# Patient Record
Sex: MALE | Race: WHITE | NOT HISPANIC OR LATINO | Employment: FULL TIME | URBAN - METROPOLITAN AREA
[De-identification: names, ages, dates, MRNs, and addresses within clinical notes are randomized per-mention and may not be internally consistent; named-entity substitution may affect disease eponyms.]

---

## 2017-01-12 ENCOUNTER — OFFICE VISIT (OUTPATIENT)
Dept: NEUROSURGERY | Facility: CLINIC | Age: 42
End: 2017-01-12
Payer: OTHER GOVERNMENT

## 2017-01-12 VITALS
SYSTOLIC BLOOD PRESSURE: 129 MMHG | HEIGHT: 71 IN | DIASTOLIC BLOOD PRESSURE: 81 MMHG | HEART RATE: 71 BPM | WEIGHT: 205.88 LBS | TEMPERATURE: 99 F | BODY MASS INDEX: 28.82 KG/M2

## 2017-01-12 DIAGNOSIS — D35.2 PITUITARY ADENOMA: Primary | ICD-10-CM

## 2017-01-12 PROCEDURE — 99213 OFFICE O/P EST LOW 20 MIN: CPT | Mod: PBBFAC | Performed by: NEUROLOGICAL SURGERY

## 2017-01-12 PROCEDURE — 99204 OFFICE O/P NEW MOD 45 MIN: CPT | Mod: S$PBB,,, | Performed by: NEUROLOGICAL SURGERY

## 2017-01-12 PROCEDURE — 99999 PR PBB SHADOW E&M-EST. PATIENT-LVL III: CPT | Mod: PBBFAC,,, | Performed by: NEUROLOGICAL SURGERY

## 2017-01-12 RX ORDER — TESTOSTERONE CYPIONATE 1000 MG/10ML
INJECTION, SOLUTION INTRAMUSCULAR
Refills: 0 | COMMUNITY
Start: 2016-11-10

## 2017-01-12 NOTE — LETTER
January 12, 2017      Jose Madison MD  [address] [facility]  [place] [address]           [facility] - Neurosurgery 7th Fl  [address]  [facility] [address]  Phone: [phone]          Patient: Roldan Moulton   MR Number: [id number]   YOB: 1975   Date of Visit: 1/12/2017       Dear Dr. Jose Madison:    Thank you for referring Roldan Moulton to me for evaluation. Attached you will find relevant portions of my assessment and plan of care.    If you have questions, please do not hesitate to call me. I look forward to following Roldan Moulton along with you.    Sincerely,    Maximiliano Kidd MD    Enclosure  CC:  No Recipients    If you would like to receive this communication electronically, please contact [email or web address] or [phone] to request more information on [facility] access.    For providers and/or their staff who would like to refer a patient to [facility], please contact us through our one-stop-shop provider referral line, [facility] , at [phone].    If you feel you have received this communication in error or would no longer like to receive these types of communications, please e-mail [email or web address]

## 2017-01-12 NOTE — MR AVS SNAPSHOT
"    [facility] - Neurosurgery   [address]  [place] [address]  Phone: [phone]                  Roldan Moulton   2017 9:00 AM   Office Visit    Description:  Male : 1975   Provider:  Maximiliano Kidd MD   Department:  [facility] - Neurosurgery 7th Fl           Reason for Visit     Brain Tumor           Diagnoses this Visit        Comments    Pituitary adenoma    -  Primary            To Do List           Goals (5 Years of Data)     None      [facility]     [facility] Nurse Care Line -  Assistance  Registered nurses in the [facility] On Call Center provide clinical advisement, health education, appointment booking, and other advisory services.  Call for this free service at [phone].             Medications           Message regarding Medications     Verify the changes and/or additions to your medication regime listed below are the same as discussed with your clinician today.  If any of these changes or additions are incorrect, please notify your healthcare provider.             Verify that the below list of medications is an accurate representation of the medications you are currently taking.  If none reported, the list may be blank. If incorrect, please contact your healthcare provider. Carry this list with you in case of emergency.           Current Medications     testosterone cypionate (DEPOTESTOTERONE CYPIONATE) 100 mg/mL injection take 1 milliliter intramuscularly every week           Clinical Reference Information           Vital Signs - Last Recorded  Most recent update: 2017  9:05 AM by Corinne Olmos MA    BP Pulse Temp Ht Wt BMI    129/81 71 98.5 °F (36.9 °C) (Oral) 5' 11" (1.803 m) 93.4 kg (205 lb 14.4 oz) 28.72 kg/m2      Blood Pressure          Most Recent Value    BP  129/81      Allergies as of 2017     No Known Allergies      Immunizations Administered on Date of Encounter - 2017     None      Orders Placed During Today's Visit     " Future Labs/Procedures Expected by Expires    MRI Brain W WO Contrast  3/14/2017 1/12/2018      [facility] Sign-Up     Activating your [facility] account is as easy as 1-2-3!     1) Visit [email or web address], select Sign Up Now, enter this activation code and your date of birth, then select Next.  [email or web address]  Expires: 2/26/2017  8:58 AM      2) Create a username and password to use when you visit [facility] in the future and select a security question in case you lose your password and select Next.    3) Enter your e-mail address and click Sign Up!    Additional Information  If you have questions, please e-mail [email or web address] or call [phone] to talk to our [facility] staff. Remember, [facility] is NOT to be used for urgent needs. For medical emergencies, dial 911.         Instructions    Will plan craniotomy and excision of tumor in March.       Smoking Cessation     If you would like to quit smoking:   You may be eligible for free services if you are a [place] resident and started smoking cigarettes before September 1, 1988.  Call the Smoking Cessation Trust (SCT) toll free at (364) [phone] or (920) 906-1321.   Call 9-665-QUIT-NOW if you do not meet the above criteria.

## 2017-01-17 NOTE — CONSULTS
January 12, 2017    Jose Madison M.D.  [id number]  [place] [address]    RE:  ITA NEWTON  [facility]  [id number]      Dear Jose:    Ita Newton was seen in neurosurgical consultation at the office this morning.    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  He was not rendered unconscious, but was   lightheaded and complained of headache.  He was seen in an Emergency Room where   a CT scan and then subsequent MRI showed a Sellar lesion.  This was felt to be   most consistent with pituitary adenoma.  You saw him at that time.  The tumor   was compressing the optic chiasm and somewhat eccentric to the right.  He   underwent transsphenoidal resection of the tumor on 06/24/15.  It was noted at   that time that the tumor involved the cavernous sinus.  Postoperatively, the   patient states that he awoke with blurring of vision in the right eye.  A   followup CT and MRI showed a hemorrhage into the sella and he returned to the   Operating Room for a transsphenoidal reoperation and removal of the blood.  The   patient subsequently showed improvement in visual acuity in the right eye, but   has been left with a left homonymous hemianopsia, which was partially present   preoperatively.  Pathology was apparently consistent with pituitary adenoma.    The patient developed diabetes insipidus in the postop period and was treated   with DDAVP.  This gradually resolved.  He apparently has had stable hormonal   status except for low testosterone and this has been managed with testosterone   injection.  The patient has subsequent studies done showing residual tumor in   the suprasellar area and ongoing compression of the right chiasm and optic   nerves and he was referred for additional neurosurgical consultation.  At this   point, he has complained of intermittent headache.  These have been severe in   the past but seem better  controlled more recently.   Formal ophthalmological   examinations have shown a fairly dense left homonymous hemianopsia.  He feels   that his energy level is not as good as it was and that he has gained weight,   which he has difficulty losing.  Past medical history is otherwise unremarkable.    He has no other chronic medical illnesses such as diabetes or hypertension.    He is  and has children.  He is a full-time naval personnel. Review of   systems is otherwise negative.    He is a well-developed, well-nourished white male who is alert and cooperative.   Examination of the head shows no tenderness over the scalp.  Eyes show full   extraocular movements.  The pupils are equal and generally reactive.  He does   show optic pallor of both optic disks and left homonymous hemianopsia to   confrontation.  As noted, he has brought recent formal visual field examination   with him confirming this.  Hearing is good bilaterally. The neck is supple. On   neurological examination, he is speaking clearly, provides a good history.    Affect is unremarkable.  Finger-to-nose was done well and gait is normal.    Cranial nerve examination is otherwise intact.  He has normal facial sensation   and movement.  The tongue protrudes in the midline.  He shows good strength in   the extremities, normal sensation and symmetrical deep tendon reflexes.    His imaging studies were reviewed.  The MRI of the brain and selective views of   the sella turcica were done at [facility] on 04/30/15.  There is a   Sellar tumor, which is primarily suprasellar involving the cavernous sinus   surrounding the carotid artery coming up to the optic chiasm primarily on the   right toward the sylvian fissure the sella is only mildly enlarged.    Postoperative MRI studies of the brain done on 05/17/16 and 11/16/16 show   residual tumor in the right suprasellar area elevating and thinning the right   optic nerve and chiasm.  The central part  of the tumor has been decompressed.    The pituitary stalk is recognized and what appears to be pituitary gland in the   left side of the sella.    IMPRESSION:  Pituitary adenoma.    RECOMMENDATIONS:  The tumor is primarily suprasellar.  The tumor has some   characteristics of meningioma, but apparently was confirmed to be pituitary   adenoma by frozen section.  With his young age and ongoing chiasmatic   compression, I believe the tumor should be removed.  This will need to be done   through a right pterional craniotomy.  I have told him that it is unlikely that   the visual field deficit will significantly improve, but this is possible.    Surgery, of course, could result in further loss of vision.  He would like to   proceed and we will plan to do this in the next few weeks.    Thank you for the opportunity to see him in neurosurgical consultation.    Sincerely yours,      ERIN  dd: 01/12/2017 15:49:10 (CST)  td: 01/13/2017 12:25:27 (CST)  Doc ID   #[id number]  Job ID #[id number]    CC: Roldan Moulton

## 2017-01-18 ENCOUNTER — TELEPHONE (OUTPATIENT)
Dept: NEUROSURGERY | Facility: CLINIC | Age: 42
End: 2017-01-18

## 2017-01-18 DIAGNOSIS — D35.2 BENIGN TUMOR OF PITUITARY GLAND: Primary | ICD-10-CM

## 2017-01-18 DIAGNOSIS — D49.6 BRAIN TUMOR: ICD-10-CM

## 2017-03-02 ENCOUNTER — TELEPHONE (OUTPATIENT)
Dept: NEUROSURGERY | Facility: CLINIC | Age: 42
End: 2017-03-02

## 2017-03-02 NOTE — TELEPHONE ENCOUNTER
----- Message from Belinda Ludwig sent at 3/2/2017 10:31 AM CST -----  Contact: Wife  Wife is calling to speak to Mr. DELGADILLO regarding the pt's scheduled brain surgery.  Wife has several questions she needs answers to and would like to speak with him about them.    She can be reached at [phone].    Thank you.

## 2017-03-02 NOTE — TELEPHONE ENCOUNTER
sw pts wife, questions were answered re upcoming surgery, reviewed arrival times and Neuro ICU info.

## 2017-03-14 ENCOUNTER — HOSPITAL ENCOUNTER (OUTPATIENT)
Dept: RADIOLOGY | Facility: HOSPITAL | Age: 42
Discharge: HOME | DRG: 614 | End: 2017-03-14
Attending: NEUROLOGICAL SURGERY
Payer: OTHER GOVERNMENT

## 2017-03-14 DIAGNOSIS — D35.2 PITUITARY ADENOMA: ICD-10-CM

## 2017-03-14 PROCEDURE — 70553 MRI BRAIN STEM W/O & W/DYE: CPT | Mod: 26,,, | Performed by: RADIOLOGY

## 2017-03-14 RX ORDER — GADOBUTROL 604.72 MG/ML
5 INJECTION INTRAVENOUS
Status: COMPLETED | OUTPATIENT
Start: 2017-03-14 | End: 2017-03-14

## 2017-03-14 RX ADMIN — GADOBUTROL 5 ML: 604.72 INJECTION INTRAVENOUS at 01:03

## 2017-03-15 ENCOUNTER — HOSPITAL ENCOUNTER (INPATIENT)
Facility: HOSPITAL | Age: 42
LOS: 9 days | Discharge: HOME | DRG: 614 | End: 2017-03-24
Attending: NEUROLOGICAL SURGERY | Admitting: NEUROLOGICAL SURGERY
Payer: OTHER GOVERNMENT

## 2017-03-15 ENCOUNTER — ANESTHESIA (OUTPATIENT)
Dept: SURGERY | Facility: HOSPITAL | Age: 42
DRG: 614 | End: 2017-03-15
Payer: OTHER GOVERNMENT

## 2017-03-15 ENCOUNTER — ANESTHESIA EVENT (OUTPATIENT)
Dept: SURGERY | Facility: HOSPITAL | Age: 42
DRG: 614 | End: 2017-03-15
Payer: OTHER GOVERNMENT

## 2017-03-15 DIAGNOSIS — D49.6 BRAIN TUMOR: ICD-10-CM

## 2017-03-15 LAB
ABO + RH BLD: NORMAL
ALBUMIN SERPL BCP-MCNC: 3.8 G/DL
ALP SERPL-CCNC: 96 U/L
ALT SERPL W/O P-5'-P-CCNC: 47 U/L
ANION GAP SERPL CALC-SCNC: 8 MMOL/L
ANION GAP SERPL CALC-SCNC: 8 MMOL/L
APTT BLDCRRT: 26.8 SEC
AST SERPL-CCNC: 23 U/L
BASOPHILS # BLD AUTO: 0.01 K/UL
BASOPHILS NFR BLD: 0.1 %
BILIRUB SERPL-MCNC: 0.4 MG/DL
BILIRUB UR QL STRIP: NEGATIVE
BILIRUB UR QL STRIP: NEGATIVE
BLD GP AB SCN CELLS X3 SERPL QL: NORMAL
BUN SERPL-MCNC: 10 MG/DL
BUN SERPL-MCNC: 9 MG/DL
CALCIUM SERPL-MCNC: 9.4 MG/DL
CALCIUM SERPL-MCNC: 9.4 MG/DL
CHLORIDE SERPL-SCNC: 111 MMOL/L
CHLORIDE SERPL-SCNC: 112 MMOL/L
CLARITY UR REFRACT.AUTO: CLEAR
CLARITY UR REFRACT.AUTO: CLEAR
CO2 SERPL-SCNC: 25 MMOL/L
CO2 SERPL-SCNC: 25 MMOL/L
COLOR UR AUTO: COLORLESS
COLOR UR AUTO: COLORLESS
CREAT SERPL-MCNC: 1 MG/DL
CREAT SERPL-MCNC: 1 MG/DL
CREAT UR-MCNC: 11 MG/DL
DIFFERENTIAL METHOD: ABNORMAL
EOSINOPHIL # BLD AUTO: 0 K/UL
EOSINOPHIL NFR BLD: 0 %
ERYTHROCYTE [DISTWIDTH] IN BLOOD BY AUTOMATED COUNT: 13.6 %
EST. GFR  (AFRICAN AMERICAN): >60 ML/MIN/1.73 M^2
EST. GFR  (AFRICAN AMERICAN): >60 ML/MIN/1.73 M^2
EST. GFR  (NON AFRICAN AMERICAN): >60 ML/MIN/1.73 M^2
EST. GFR  (NON AFRICAN AMERICAN): >60 ML/MIN/1.73 M^2
GLUCOSE SERPL-MCNC: 113 MG/DL (ref 70–110)
GLUCOSE SERPL-MCNC: 130 MG/DL (ref 70–110)
GLUCOSE SERPL-MCNC: 134 MG/DL (ref 70–110)
GLUCOSE SERPL-MCNC: 157 MG/DL
GLUCOSE SERPL-MCNC: 169 MG/DL
GLUCOSE UR QL STRIP: NEGATIVE
GLUCOSE UR QL STRIP: NEGATIVE
HCO3 UR-SCNC: 18.6 MMOL/L (ref 24–28)
HCO3 UR-SCNC: 19.9 MMOL/L (ref 24–28)
HCO3 UR-SCNC: 21.4 MMOL/L (ref 24–28)
HCT VFR BLD AUTO: 48.8 %
HCT VFR BLD CALC: 39 %PCV (ref 36–54)
HCT VFR BLD CALC: 43 %PCV (ref 36–54)
HCT VFR BLD CALC: 44 %PCV (ref 36–54)
HGB BLD-MCNC: 17.4 G/DL
HGB UR QL STRIP: ABNORMAL
HGB UR QL STRIP: NEGATIVE
INR PPP: 0.9
KETONES UR QL STRIP: NEGATIVE
KETONES UR QL STRIP: NEGATIVE
LEUKOCYTE ESTERASE UR QL STRIP: NEGATIVE
LEUKOCYTE ESTERASE UR QL STRIP: NEGATIVE
LYMPHOCYTES # BLD AUTO: 0.9 K/UL
LYMPHOCYTES NFR BLD: 5.6 %
MAGNESIUM SERPL-MCNC: 2 MG/DL
MCH RBC QN AUTO: 30.3 PG
MCHC RBC AUTO-ENTMCNC: 35.7 %
MCV RBC AUTO: 85 FL
MICROSCOPIC COMMENT: NORMAL
MONOCYTES # BLD AUTO: 0.2 K/UL
MONOCYTES NFR BLD: 1.4 %
NEUTROPHILS # BLD AUTO: 14.3 K/UL
NEUTROPHILS NFR BLD: 92.6 %
NITRITE UR QL STRIP: NEGATIVE
NITRITE UR QL STRIP: NEGATIVE
PCO2 BLDA: 30.7 MMHG (ref 35–45)
PCO2 BLDA: 31.4 MMHG (ref 35–45)
PCO2 BLDA: 36 MMHG (ref 35–45)
PH SMN: 7.38 [PH] (ref 7.35–7.45)
PH SMN: 7.38 [PH] (ref 7.35–7.45)
PH SMN: 7.42 [PH] (ref 7.35–7.45)
PH UR STRIP: 5 [PH] (ref 5–8)
PH UR STRIP: 5 [PH] (ref 5–8)
PHOSPHATE SERPL-MCNC: 1.7 MG/DL
PLATELET # BLD AUTO: 298 K/UL
PMV BLD AUTO: 11.8 FL
PO2 BLDA: 100 MMHG (ref 80–100)
PO2 BLDA: 84 MMHG (ref 80–100)
PO2 BLDA: 90 MMHG (ref 80–100)
POC BE: -4 MMOL/L
POC BE: -5 MMOL/L
POC BE: -6 MMOL/L
POC IONIZED CALCIUM: 1.05 MMOL/L (ref 1.06–1.42)
POC IONIZED CALCIUM: 1.11 MMOL/L (ref 1.06–1.42)
POC IONIZED CALCIUM: 1.19 MMOL/L (ref 1.06–1.42)
POC SATURATED O2: 96 % (ref 95–100)
POC SATURATED O2: 97 % (ref 95–100)
POC SATURATED O2: 98 % (ref 95–100)
POC TCO2: 20 MMOL/L (ref 23–27)
POC TCO2: 21 MMOL/L (ref 23–27)
POC TCO2: 22 MMOL/L (ref 23–27)
POTASSIUM BLD-SCNC: 4.3 MMOL/L (ref 3.5–5.1)
POTASSIUM BLD-SCNC: 4.5 MMOL/L (ref 3.5–5.1)
POTASSIUM BLD-SCNC: 4.7 MMOL/L (ref 3.5–5.1)
POTASSIUM SERPL-SCNC: 3.9 MMOL/L
POTASSIUM SERPL-SCNC: 4 MMOL/L
POTASSIUM UR-SCNC: <10 MMOL/L
PROT SERPL-MCNC: 6.9 G/DL
PROT UR QL STRIP: NEGATIVE
PROT UR QL STRIP: NEGATIVE
PROTHROMBIN TIME: 10 SEC
RBC # BLD AUTO: 5.75 M/UL
RBC #/AREA URNS AUTO: 0 /HPF (ref 0–4)
SAMPLE: ABNORMAL
SODIUM BLD-SCNC: 135 MMOL/L (ref 136–145)
SODIUM BLD-SCNC: 138 MMOL/L (ref 136–145)
SODIUM BLD-SCNC: 141 MMOL/L (ref 136–145)
SODIUM SERPL-SCNC: 144 MMOL/L
SODIUM SERPL-SCNC: 145 MMOL/L
SODIUM SERPL-SCNC: 151 MMOL/L
SODIUM UR-SCNC: 34 MMOL/L
SP GR UR STRIP: 1 (ref 1–1.03)
SP GR UR STRIP: 1 (ref 1–1.03)
URN SPEC COLLECT METH UR: ABNORMAL
URN SPEC COLLECT METH UR: ABNORMAL
UROBILINOGEN UR STRIP-ACNC: NEGATIVE EU/DL
UROBILINOGEN UR STRIP-ACNC: NEGATIVE EU/DL
WBC # BLD AUTO: 15.4 K/UL
WBC #/AREA URNS AUTO: 1 /HPF (ref 0–5)

## 2017-03-15 PROCEDURE — 85610 PROTHROMBIN TIME: CPT

## 2017-03-15 PROCEDURE — 27200677 HC TRANSDUCER MONITOR KIT SINGLE: Performed by: NURSE ANESTHETIST, CERTIFIED REGISTERED

## 2017-03-15 PROCEDURE — 25000003 PHARM REV CODE 250: Performed by: NEUROLOGICAL SURGERY

## 2017-03-15 PROCEDURE — 25000003 PHARM REV CODE 250: Performed by: STUDENT IN AN ORGANIZED HEALTH CARE EDUCATION/TRAINING PROGRAM

## 2017-03-15 PROCEDURE — 85730 THROMBOPLASTIN TIME PARTIAL: CPT

## 2017-03-15 PROCEDURE — 63600175 PHARM REV CODE 636 W HCPCS: Performed by: STUDENT IN AN ORGANIZED HEALTH CARE EDUCATION/TRAINING PROGRAM

## 2017-03-15 PROCEDURE — 84295 ASSAY OF SERUM SODIUM: CPT

## 2017-03-15 PROCEDURE — C1713 ANCHOR/SCREW BN/BN,TIS/BN: HCPCS | Performed by: NEUROLOGICAL SURGERY

## 2017-03-15 PROCEDURE — 88305 TISSUE EXAM BY PATHOLOGIST: CPT | Mod: 26,,, | Performed by: PATHOLOGY

## 2017-03-15 PROCEDURE — 81001 URINALYSIS AUTO W/SCOPE: CPT

## 2017-03-15 PROCEDURE — 88331 PATH CONSLTJ SURG 1 BLK 1SPC: CPT | Mod: 26,,, | Performed by: PATHOLOGY

## 2017-03-15 PROCEDURE — 81003 URINALYSIS AUTO W/O SCOPE: CPT

## 2017-03-15 PROCEDURE — 88305 TISSUE EXAM BY PATHOLOGIST: CPT | Performed by: PATHOLOGY

## 2017-03-15 PROCEDURE — 84133 ASSAY OF URINE POTASSIUM: CPT

## 2017-03-15 PROCEDURE — 27800505 HC CATH, RADIAL ARTERY KIT: Performed by: NURSE ANESTHETIST, CERTIFIED REGISTERED

## 2017-03-15 PROCEDURE — 27100025 HC TUBING, SET FLUID WARMER: Performed by: NURSE ANESTHETIST, CERTIFIED REGISTERED

## 2017-03-15 PROCEDURE — 36000713 HC OR TIME LEV V EA ADD 15 MIN: Performed by: NEUROLOGICAL SURGERY

## 2017-03-15 PROCEDURE — 80048 BASIC METABOLIC PNL TOTAL CA: CPT

## 2017-03-15 PROCEDURE — D9220A PRA ANESTHESIA: Mod: ANES,,, | Performed by: ANESTHESIOLOGY

## 2017-03-15 PROCEDURE — 36000712 HC OR TIME LEV V 1ST 15 MIN: Performed by: NEUROLOGICAL SURGERY

## 2017-03-15 PROCEDURE — 84100 ASSAY OF PHOSPHORUS: CPT

## 2017-03-15 PROCEDURE — 86850 RBC ANTIBODY SCREEN: CPT

## 2017-03-15 PROCEDURE — 83735 ASSAY OF MAGNESIUM: CPT

## 2017-03-15 PROCEDURE — 63600175 PHARM REV CODE 636 W HCPCS: Performed by: ANESTHESIOLOGY

## 2017-03-15 PROCEDURE — 80053 COMPREHEN METABOLIC PANEL: CPT

## 2017-03-15 PROCEDURE — 20000000 HC ICU ROOM

## 2017-03-15 PROCEDURE — 61510 CRNEC TREPH EXC BRN TUM STTL: CPT | Mod: 22,,, | Performed by: NEUROLOGICAL SURGERY

## 2017-03-15 PROCEDURE — 84295 ASSAY OF SERUM SODIUM: CPT | Mod: 91

## 2017-03-15 PROCEDURE — 0GB00ZZ EXCISION OF PITUITARY GLAND, OPEN APPROACH: ICD-10-PCS | Performed by: NEUROLOGICAL SURGERY

## 2017-03-15 PROCEDURE — 63600175 PHARM REV CODE 636 W HCPCS: Performed by: NURSE ANESTHETIST, CERTIFIED REGISTERED

## 2017-03-15 PROCEDURE — 37000009 HC ANESTHESIA EA ADD 15 MINS: Performed by: NEUROLOGICAL SURGERY

## 2017-03-15 PROCEDURE — 25000003 PHARM REV CODE 250

## 2017-03-15 PROCEDURE — 71000039 HC RECOVERY, EACH ADD'L HOUR: Performed by: NEUROLOGICAL SURGERY

## 2017-03-15 PROCEDURE — 61781 SCAN PROC CRANIAL INTRA: CPT | Mod: ,,, | Performed by: NEUROLOGICAL SURGERY

## 2017-03-15 PROCEDURE — 67570 DECOMPRESS OPTIC NERVE: CPT | Mod: 51,50,, | Performed by: NEUROLOGICAL SURGERY

## 2017-03-15 PROCEDURE — 84300 ASSAY OF URINE SODIUM: CPT

## 2017-03-15 PROCEDURE — C1762 CONN TISS, HUMAN(INC FASCIA): HCPCS | Performed by: NEUROLOGICAL SURGERY

## 2017-03-15 PROCEDURE — 27000221 HC OXYGEN, UP TO 24 HOURS

## 2017-03-15 PROCEDURE — 27201423 OPTIME MED/SURG SUP & DEVICES STERILE SUPPLY: Performed by: NEUROLOGICAL SURGERY

## 2017-03-15 PROCEDURE — 86920 COMPATIBILITY TEST SPIN: CPT

## 2017-03-15 PROCEDURE — 25000003 PHARM REV CODE 250: Performed by: NURSE ANESTHETIST, CERTIFIED REGISTERED

## 2017-03-15 PROCEDURE — 63600175 PHARM REV CODE 636 W HCPCS: Performed by: PHYSICIAN ASSISTANT

## 2017-03-15 PROCEDURE — 86900 BLOOD TYPING SEROLOGIC ABO: CPT

## 2017-03-15 PROCEDURE — 63600175 PHARM REV CODE 636 W HCPCS: Performed by: NEUROLOGICAL SURGERY

## 2017-03-15 PROCEDURE — 82570 ASSAY OF URINE CREATININE: CPT

## 2017-03-15 PROCEDURE — 37000008 HC ANESTHESIA 1ST 15 MINUTES: Performed by: NEUROLOGICAL SURGERY

## 2017-03-15 PROCEDURE — 85025 COMPLETE CBC W/AUTO DIFF WBC: CPT

## 2017-03-15 PROCEDURE — D9220A PRA ANESTHESIA: Mod: CRNA,,, | Performed by: NURSE ANESTHETIST, CERTIFIED REGISTERED

## 2017-03-15 PROCEDURE — 94761 N-INVAS EAR/PLS OXIMETRY MLT: CPT

## 2017-03-15 PROCEDURE — 71000033 HC RECOVERY, INTIAL HOUR: Performed by: NEUROLOGICAL SURGERY

## 2017-03-15 DEVICE — PLATE CRANIAL UN3 BOX LG 2X2: Type: IMPLANTABLE DEVICE | Site: CRANIAL | Status: FUNCTIONAL

## 2017-03-15 DEVICE — SCREW SD 1.5X4MM TI CROSS PIN: Type: IMPLANTABLE DEVICE | Site: CRANIAL | Status: FUNCTIONAL

## 2017-03-15 DEVICE — DURA MATRIX ONLAY PLUS 3X3: Type: IMPLANTABLE DEVICE | Site: CRANIAL | Status: FUNCTIONAL

## 2017-03-15 RX ORDER — LIDOCAINE HYDROCHLORIDE 10 MG/ML
1 INJECTION, SOLUTION EPIDURAL; INFILTRATION; INTRACAUDAL; PERINEURAL ONCE
Status: COMPLETED | OUTPATIENT
Start: 2017-03-15 | End: 2017-03-15

## 2017-03-15 RX ORDER — ONDANSETRON 2 MG/ML
INJECTION INTRAMUSCULAR; INTRAVENOUS
Status: DISCONTINUED | OUTPATIENT
Start: 2017-03-15 | End: 2017-03-15

## 2017-03-15 RX ORDER — HYDROMORPHONE HYDROCHLORIDE 1 MG/ML
0.2 INJECTION, SOLUTION INTRAMUSCULAR; INTRAVENOUS; SUBCUTANEOUS EVERY 5 MIN PRN
Status: DISCONTINUED | OUTPATIENT
Start: 2017-03-15 | End: 2017-03-15 | Stop reason: HOSPADM

## 2017-03-15 RX ORDER — MIDAZOLAM HYDROCHLORIDE 1 MG/ML
INJECTION, SOLUTION INTRAMUSCULAR; INTRAVENOUS
Status: DISCONTINUED | OUTPATIENT
Start: 2017-03-15 | End: 2017-03-15

## 2017-03-15 RX ORDER — SODIUM CHLORIDE 0.9 % (FLUSH) 0.9 %
3 SYRINGE (ML) INJECTION EVERY 8 HOURS
Status: DISCONTINUED | OUTPATIENT
Start: 2017-03-15 | End: 2017-03-15

## 2017-03-15 RX ORDER — ONDANSETRON 2 MG/ML
8 INJECTION INTRAMUSCULAR; INTRAVENOUS EVERY 8 HOURS PRN
Status: DISCONTINUED | OUTPATIENT
Start: 2017-03-15 | End: 2017-03-24 | Stop reason: HOSPADM

## 2017-03-15 RX ORDER — NEOSTIGMINE METHYLSULFATE 1 MG/ML
INJECTION, SOLUTION INTRAVENOUS
Status: DISCONTINUED | OUTPATIENT
Start: 2017-03-15 | End: 2017-03-15

## 2017-03-15 RX ORDER — FENTANYL CITRATE 50 UG/ML
25 INJECTION, SOLUTION INTRAMUSCULAR; INTRAVENOUS ONCE AS NEEDED
Status: COMPLETED | OUTPATIENT
Start: 2017-03-15 | End: 2017-03-15

## 2017-03-15 RX ORDER — LEVETIRACETAM 500 MG/1
500 TABLET ORAL 2 TIMES DAILY
Status: COMPLETED | OUTPATIENT
Start: 2017-03-15 | End: 2017-03-22

## 2017-03-15 RX ORDER — FENTANYL CITRATE 50 UG/ML
INJECTION, SOLUTION INTRAMUSCULAR; INTRAVENOUS
Status: DISCONTINUED | OUTPATIENT
Start: 2017-03-15 | End: 2017-03-15

## 2017-03-15 RX ORDER — LABETALOL HYDROCHLORIDE 5 MG/ML
10 INJECTION, SOLUTION INTRAVENOUS EVERY 10 MIN PRN
Status: DISCONTINUED | OUTPATIENT
Start: 2017-03-15 | End: 2017-03-16

## 2017-03-15 RX ORDER — LIDOCAINE HCL/PF 100 MG/5ML
SYRINGE (ML) INTRAVENOUS
Status: DISCONTINUED | OUTPATIENT
Start: 2017-03-15 | End: 2017-03-15

## 2017-03-15 RX ORDER — PHENYTOIN SODIUM 50 MG/ML
INJECTION INTRAMUSCULAR; INTRAVENOUS
Status: DISCONTINUED | OUTPATIENT
Start: 2017-03-15 | End: 2017-03-15

## 2017-03-15 RX ORDER — BACITRACIN 50000 [IU]/1
INJECTION, POWDER, FOR SOLUTION INTRAMUSCULAR
Status: DISCONTINUED | OUTPATIENT
Start: 2017-03-15 | End: 2017-03-15 | Stop reason: HOSPADM

## 2017-03-15 RX ORDER — OXYCODONE AND ACETAMINOPHEN 5; 325 MG/1; MG/1
1 TABLET ORAL EVERY 8 HOURS PRN
Status: DISCONTINUED | OUTPATIENT
Start: 2017-03-15 | End: 2017-03-15

## 2017-03-15 RX ORDER — SODIUM CHLORIDE 0.9 % (FLUSH) 0.9 %
3 SYRINGE (ML) INJECTION
Status: DISCONTINUED | OUTPATIENT
Start: 2017-03-15 | End: 2017-03-24 | Stop reason: HOSPADM

## 2017-03-15 RX ORDER — HYDROCORTISONE 20 MG/1
20 TABLET ORAL
Status: COMPLETED | OUTPATIENT
Start: 2017-03-18 | End: 2017-03-18

## 2017-03-15 RX ORDER — MANNITOL 250 MG/ML
INJECTION, SOLUTION INTRAVENOUS
Status: DISCONTINUED | OUTPATIENT
Start: 2017-03-15 | End: 2017-03-15

## 2017-03-15 RX ORDER — SODIUM CHLORIDE 9 MG/ML
INJECTION, SOLUTION INTRAVENOUS CONTINUOUS
Status: DISCONTINUED | OUTPATIENT
Start: 2017-03-15 | End: 2017-03-15

## 2017-03-15 RX ORDER — ROCURONIUM BROMIDE 10 MG/ML
INJECTION, SOLUTION INTRAVENOUS
Status: DISCONTINUED | OUTPATIENT
Start: 2017-03-15 | End: 2017-03-15

## 2017-03-15 RX ORDER — OXYCODONE AND ACETAMINOPHEN 5; 325 MG/1; MG/1
1 TABLET ORAL EVERY 6 HOURS PRN
Status: DISCONTINUED | OUTPATIENT
Start: 2017-03-15 | End: 2017-03-17

## 2017-03-15 RX ORDER — NICARDIPINE HYDROCHLORIDE 0.2 MG/ML
INJECTION INTRAVENOUS
Status: DISPENSED
Start: 2017-03-15 | End: 2017-03-16

## 2017-03-15 RX ORDER — GLYCOPYRROLATE 0.2 MG/ML
INJECTION INTRAMUSCULAR; INTRAVENOUS
Status: DISCONTINUED | OUTPATIENT
Start: 2017-03-15 | End: 2017-03-15

## 2017-03-15 RX ORDER — ONDANSETRON 2 MG/ML
4 INJECTION INTRAMUSCULAR; INTRAVENOUS ONCE AS NEEDED
Status: DISCONTINUED | OUTPATIENT
Start: 2017-03-15 | End: 2017-03-15

## 2017-03-15 RX ORDER — NICARDIPINE HYDROCHLORIDE 0.2 MG/ML
1 INJECTION INTRAVENOUS CONTINUOUS
Status: DISCONTINUED | OUTPATIENT
Start: 2017-03-15 | End: 2017-03-16

## 2017-03-15 RX ORDER — CEFAZOLIN SODIUM 1 G/50ML
1 SOLUTION INTRAVENOUS
Status: DISCONTINUED | OUTPATIENT
Start: 2017-03-15 | End: 2017-03-18

## 2017-03-15 RX ORDER — HYDROCORTISONE 10 MG/1
10 TABLET ORAL NIGHTLY
Status: DISCONTINUED | OUTPATIENT
Start: 2017-03-18 | End: 2017-03-24 | Stop reason: HOSPADM

## 2017-03-15 RX ORDER — DEXAMETHASONE SODIUM PHOSPHATE 4 MG/ML
INJECTION, SOLUTION INTRA-ARTICULAR; INTRALESIONAL; INTRAMUSCULAR; INTRAVENOUS; SOFT TISSUE
Status: DISCONTINUED | OUTPATIENT
Start: 2017-03-15 | End: 2017-03-15

## 2017-03-15 RX ORDER — SODIUM CHLORIDE 0.9 % (FLUSH) 0.9 %
3 SYRINGE (ML) INJECTION EVERY 8 HOURS
Status: DISCONTINUED | OUTPATIENT
Start: 2017-03-15 | End: 2017-03-24 | Stop reason: HOSPADM

## 2017-03-15 RX ORDER — FENTANYL CITRATE 50 UG/ML
25 INJECTION, SOLUTION INTRAMUSCULAR; INTRAVENOUS ONCE
Status: COMPLETED | OUTPATIENT
Start: 2017-03-15 | End: 2017-03-15

## 2017-03-15 RX ORDER — ACETAMINOPHEN 10 MG/ML
1000 INJECTION, SOLUTION INTRAVENOUS ONCE
Status: COMPLETED | OUTPATIENT
Start: 2017-03-15 | End: 2017-03-15

## 2017-03-15 RX ORDER — LIDOCAINE HYDROCHLORIDE AND EPINEPHRINE 20; 10 MG/ML; UG/ML
INJECTION, SOLUTION INFILTRATION; PERINEURAL
Status: DISCONTINUED | OUTPATIENT
Start: 2017-03-15 | End: 2017-03-15 | Stop reason: HOSPADM

## 2017-03-15 RX ORDER — PROPOFOL 10 MG/ML
VIAL (ML) INTRAVENOUS
Status: DISCONTINUED | OUTPATIENT
Start: 2017-03-15 | End: 2017-03-15

## 2017-03-15 RX ORDER — SUCCINYLCHOLINE CHLORIDE 20 MG/ML
INJECTION INTRAMUSCULAR; INTRAVENOUS
Status: DISCONTINUED | OUTPATIENT
Start: 2017-03-15 | End: 2017-03-15

## 2017-03-15 RX ORDER — HYDROCORTISONE 20 MG/1
100 TABLET ORAL 2 TIMES DAILY
Status: COMPLETED | OUTPATIENT
Start: 2017-03-15 | End: 2017-03-16

## 2017-03-15 RX ADMIN — GLYCOPYRROLATE 0.6 MG: 0.2 INJECTION, SOLUTION INTRAMUSCULAR; INTRAVENOUS at 01:03

## 2017-03-15 RX ADMIN — Medication 3 ML: at 09:03

## 2017-03-15 RX ADMIN — DEXAMETHASONE SODIUM PHOSPHATE 12 MG: 4 INJECTION, SOLUTION INTRAMUSCULAR; INTRAVENOUS at 08:03

## 2017-03-15 RX ADMIN — ROCURONIUM BROMIDE 40 MG: 10 INJECTION, SOLUTION INTRAVENOUS at 08:03

## 2017-03-15 RX ADMIN — SODIUM CHLORIDE: 0.9 INJECTION, SOLUTION INTRAVENOUS at 11:03

## 2017-03-15 RX ADMIN — ONDANSETRON 4 MG: 2 INJECTION INTRAMUSCULAR; INTRAVENOUS at 12:03

## 2017-03-15 RX ADMIN — ROCURONIUM BROMIDE 10 MG: 10 INJECTION, SOLUTION INTRAVENOUS at 10:03

## 2017-03-15 RX ADMIN — FENTANYL CITRATE 50 MCG: 50 INJECTION, SOLUTION INTRAMUSCULAR; INTRAVENOUS at 12:03

## 2017-03-15 RX ADMIN — NICARDIPINE HYDROCHLORIDE 12.5 MG/HR: 0.2 INJECTION, SOLUTION INTRAVENOUS at 07:03

## 2017-03-15 RX ADMIN — CEFAZOLIN SODIUM 1 G: 1 SOLUTION INTRAVENOUS at 03:03

## 2017-03-15 RX ADMIN — FENTANYL CITRATE 50 MCG: 50 INJECTION, SOLUTION INTRAMUSCULAR; INTRAVENOUS at 08:03

## 2017-03-15 RX ADMIN — CEFTRIAXONE 2 G: 2 INJECTION, SOLUTION INTRAVENOUS at 08:03

## 2017-03-15 RX ADMIN — ROCURONIUM BROMIDE 10 MG: 10 INJECTION, SOLUTION INTRAVENOUS at 09:03

## 2017-03-15 RX ADMIN — PHENYTOIN SODIUM 500 MG: 50 INJECTION INTRAMUSCULAR; INTRAVENOUS at 09:03

## 2017-03-15 RX ADMIN — NICARDIPINE HYDROCHLORIDE 12.5 MG/HR: 0.2 INJECTION, SOLUTION INTRAVENOUS at 10:03

## 2017-03-15 RX ADMIN — MIDAZOLAM HYDROCHLORIDE 2 MG: 1 INJECTION, SOLUTION INTRAMUSCULAR; INTRAVENOUS at 07:03

## 2017-03-15 RX ADMIN — SODIUM CHLORIDE 50 ML: 9 INJECTION, SOLUTION INTRAVENOUS at 11:03

## 2017-03-15 RX ADMIN — LEVETIRACETAM 500 MG: 500 TABLET, FILM COATED ORAL at 08:03

## 2017-03-15 RX ADMIN — ROCURONIUM BROMIDE 10 MG: 10 INJECTION, SOLUTION INTRAVENOUS at 11:03

## 2017-03-15 RX ADMIN — DESMOPRESSIN ACETATE 10 MCG: 0.1 SOLUTION NASAL at 11:03

## 2017-03-15 RX ADMIN — OXYCODONE HYDROCHLORIDE AND ACETAMINOPHEN 1 TABLET: 5; 325 TABLET ORAL at 06:03

## 2017-03-15 RX ADMIN — CALCIUM CHLORIDE 500 MG: 100 INJECTION, SOLUTION INTRAVENOUS at 10:03

## 2017-03-15 RX ADMIN — LIDOCAINE HYDROCHLORIDE 60 MG: 20 INJECTION, SOLUTION INTRAVENOUS at 08:03

## 2017-03-15 RX ADMIN — NICARDIPINE HYDROCHLORIDE 5 MG/HR: 0.2 INJECTION, SOLUTION INTRAVENOUS at 04:03

## 2017-03-15 RX ADMIN — FENTANYL CITRATE 25 MCG: 50 INJECTION INTRAMUSCULAR; INTRAVENOUS at 08:03

## 2017-03-15 RX ADMIN — ROCURONIUM BROMIDE 10 MG: 10 INJECTION, SOLUTION INTRAVENOUS at 08:03

## 2017-03-15 RX ADMIN — FENTANYL CITRATE 25 MCG: 50 INJECTION INTRAMUSCULAR; INTRAVENOUS at 03:03

## 2017-03-15 RX ADMIN — ROCURONIUM BROMIDE 10 MG: 10 INJECTION, SOLUTION INTRAVENOUS at 12:03

## 2017-03-15 RX ADMIN — ACETAMINOPHEN 1000 MG: 10 INJECTION, SOLUTION INTRAVENOUS at 03:03

## 2017-03-15 RX ADMIN — ROCURONIUM BROMIDE 10 MG: 10 INJECTION, SOLUTION INTRAVENOUS at 01:03

## 2017-03-15 RX ADMIN — FENTANYL CITRATE 50 MCG: 50 INJECTION, SOLUTION INTRAMUSCULAR; INTRAVENOUS at 09:03

## 2017-03-15 RX ADMIN — FENTANYL CITRATE 100 MCG: 50 INJECTION, SOLUTION INTRAMUSCULAR; INTRAVENOUS at 08:03

## 2017-03-15 RX ADMIN — LIDOCAINE HYDROCHLORIDE 0.1 MG: 10 INJECTION, SOLUTION EPIDURAL; INFILTRATION; INTRACAUDAL; PERINEURAL at 07:03

## 2017-03-15 RX ADMIN — PROPOFOL 50 MG: 10 INJECTION, EMULSION INTRAVENOUS at 09:03

## 2017-03-15 RX ADMIN — HYDROCORTISONE 100 MG: 20 TABLET ORAL at 08:03

## 2017-03-15 RX ADMIN — SUCCINYLCHOLINE CHLORIDE 160 MG: 20 INJECTION, SOLUTION INTRAMUSCULAR; INTRAVENOUS at 08:03

## 2017-03-15 RX ADMIN — CEFAZOLIN SODIUM 1 G: 1 SOLUTION INTRAVENOUS at 10:03

## 2017-03-15 RX ADMIN — SODIUM CHLORIDE: 0.9 INJECTION, SOLUTION INTRAVENOUS at 07:03

## 2017-03-15 RX ADMIN — PROPOFOL 200 MG: 10 INJECTION, EMULSION INTRAVENOUS at 08:03

## 2017-03-15 RX ADMIN — MANNITOL 50 G: 250 INJECTION, SOLUTION INTRAVENOUS at 09:03

## 2017-03-15 RX ADMIN — SODIUM CHLORIDE, SODIUM GLUCONATE, SODIUM ACETATE, POTASSIUM CHLORIDE, MAGNESIUM CHLORIDE, SODIUM PHOSPHATE, DIBASIC, AND POTASSIUM PHOSPHATE: .53; .5; .37; .037; .03; .012; .00082 INJECTION, SOLUTION INTRAVENOUS at 08:03

## 2017-03-15 RX ADMIN — NEOSTIGMINE METHYLSULFATE 5 MG: 1 INJECTION INTRAVENOUS at 01:03

## 2017-03-15 NOTE — PROGRESS NOTES
Patient is groggy but awake. Answers questions of how many fingers appropriately for MDs at bedside and falls back to sleep. MD to go and speak with family in waiting room. VSS, will continue to monitor.

## 2017-03-15 NOTE — PROGRESS NOTES
965 ml clear yellow urine from de guzman catheter. NCC called to make aware. Spoke to CRUZ [place]. No orders at this time, will call back with any changes.

## 2017-03-15 NOTE — PROGRESS NOTES
Wife updated on the phone, all questions answered and she knows she can see patient at 1600 in pacu. NCC at bedside to assess. Cardene gtt at 10mg/hr, [id number]. Will continue to monitor.

## 2017-03-15 NOTE — PROGRESS NOTES
Confirmed with Dr Moe not to give dilaudid for pain. See Fentanyl order. Also, not to continue IVF post op.

## 2017-03-15 NOTE — TRANSFER OF CARE
"Anesthesia Transfer of Care Note    Patient: Roldan Moulton    Procedure(s) Performed: Procedure(s) (LRB):  CRANIOTOMY WITH STEALTH, RIGHT FRONTOTEMPORAL CRANIOTOMY for tumor (Right)    Patient location: PACU    Anesthesia Type: general    Transport from OR: Transported from OR on 6-10 L/min O2 by face mask with adequate spontaneous ventilation    Post pain: adequate analgesia    Post assessment: no apparent anesthetic complications    Post vital signs: stable    Level of consciousness: awake, alert and oriented    Nausea/Vomiting: no nausea/vomiting    Complications: none          Last vitals:   Visit Vitals    BP (!) 146/85 (BP Location: Right arm, Patient Position: Lying, BP Method: Automatic)    Pulse 93    Temp 36.8 °C (98.3 °F) (Oral)    Resp 15    Ht 5' 10" (1.778 m)    Wt 88.5 kg (195 lb)    BMI 27.98 kg/m2     "

## 2017-03-15 NOTE — IP AVS SNAPSHOT
[facility]  [address]  [facility] [address]  Phone: [phone]           Patient Discharge Instructions     Our goal is to set you up for success. This packet includes information on your condition, medications, and your home care. It will help you to care for yourself so you don't get sicker and need to go back to the hospital.     Please ask your nurse if you have any questions.        There are many details to remember when preparing to leave the hospital. Here is what you will need to do:    1. Take your medicine. If you are prescribed medications, review your Medication List in the following pages. You may have new medications to  at the pharmacy and others that you'll need to stop taking. Review the instructions for how and when to take your medications. Talk with your doctor or nurses if you are unsure of what to do.     2. Go to your follow-up appointments. Specific follow-up information is listed in the following pages. Your may be contacted by a transition nurse or clinical provider about future appointments. Be sure we have all of the phone numbers to reach you, if needed. Please contact your provider's office if you are unable to make an appointment.     3. Watch for warning signs. Your doctor or nurse will give you detailed warning signs to watch for and when to call for assistance. These instructions may also include educational information about your condition. If you experience any of warning signs to your health, call your doctor.               [facility]  Unless otherwise directed by your provider, please contact [facility], our nurse care line that is available for 24/7 assistance.     [phone] (toll-free)    Registered nurses in the [facility] On Call Center provide clinical advisement, health education, appointment booking, and other advisory services.                    ** Verify the list of medication(s) below is accurate and up  to date. Carry this with you in case of emergency. If your medications have changed, please notify your healthcare provider.             Medication List      START taking these medications        Additional Info                      bacitracin 500 unit/gram ointment   Refills:  0    Last time this was given:  3/24/2017 10:00 AM   Instructions:  Apply topically 2 (two) times daily.     Begin Date    AM    Noon    PM    Bedtime       docusate sodium 100 MG capsule   Commonly known as:  COLACE   Refills:  0   Dose:  100 mg    Last time this was given:  100 mg on 3/24/2017 10:00 AM   Instructions:  Take 1 capsule (100 mg total) by mouth 2 (two) times daily.     Begin Date    AM    Noon    PM    Bedtime       hydrocortisone 10 MG Tab   Commonly known as:  CORTEF   Quantity:  30 tablet   Refills:  0   Dose:  10 mg    Last time this was given:  10 mg on 3/23/2017  8:17 PM   Instructions:  Take 1 tablet (10 mg total) by mouth every evening.     Begin Date    AM    Noon    PM    Bedtime       levetiracetam 500 MG Tab   Commonly known as:  KEPPRA   Quantity:  60 tablet   Refills:  1   Dose:  500 mg    Last time this was given:  500 mg on 3/22/2017 10:09 AM   Instructions:  Take 1 tablet (500 mg total) by mouth 2 (two) times daily.     Begin Date    AM    Noon    PM    Bedtime       oxycodone 5 MG immediate release tablet   Commonly known as:  ROXICODONE   Quantity:  75 tablet   Refills:  0   Dose:  5 mg    Last time this was given:  15 mg on 3/24/2017  2:12 AM   Instructions:  Take 1 tablet (5 mg total) by mouth every 6 (six) hours as needed.     Begin Date    AM    Noon    PM    Bedtime         CONTINUE taking these medications        Additional Info                      testosterone cypionate 100 mg/mL injection   Commonly known as:  DEPOTESTOTERONE CYPIONATE   Refills:  0    Instructions:  take 1 milliliter intramuscularly every week     Begin Date    AM    Noon    PM    Bedtime            Where to Get Your Medications       You can get these medications from any pharmacy     Bring a paper prescription for each of these medications     hydrocortisone 10 MG Tab    levetiracetam 500 MG Tab    oxycodone 5 MG immediate release tablet       You don't need a prescription for these medications     bacitracin 500 unit/gram ointment    docusate sodium 100 MG capsule                  Please bring to all follow up appointments:    1. A copy of your discharge instructions.  2. All medicines you are currently taking in their original bottles.  3. Identification and insurance card.    Please arrive 15 minutes ahead of scheduled appointment time.    Please call 24 hours in advance if you must reschedule your appointment and/or time.        Your Scheduled Appointments     Mar 30, 2017 10:30 AM CDT   Ct Head Non Contrast with [facility] CT2 64- LIMIT 600 LBS   [facility] ([facility] )    [address] [facility] [address]   [phone]            Mar 30, 2017 11:30 AM CDT   Post OP with MD Hi Simmons [facility] - Neurosurgery 7th Fl ([facility] )    [address]  [place] [address]   [phone]            Apr 11, 2017  1:30 PM CDT   Physical with Morgan Tracy MD   [facility] ([facility])    [address] [facility] 3rd Floor [facility] [address]   [phone]              Referrals     Future Orders    Ambulatory consult to Speech Therapy     Ambulatory Referral to Physical/Occupational Therapy     Questions:    Post Surgical?:  Yes    Eval and Treat:  Yes    Duration:  90 days    Frequency (times per week):  Three    Precautions:      Location:      OT Location:      Restore Functional ADL Training?:      Gait Training:      Therapeutic Exercises:      Wound Care:      Traction:      Electric Stimulation:      IONTO.:      U/S:      Developmental Stimulation?:      Specialty Programs:            Discharge Instructions       Please follow ONLY the  instructions that are checked below.    Activity Restrictions:  [x]  Return to work will be determined on an individual basis.  [x]  No lifting greater than 10 pounds.  [x]  No driving or operating machinery:  [x]  until cleared by your surgeon.  [x]  while taking narcotic pain medications or muscle relaxants.  [x]  Increase ambulation over the next 2 weeks so that you are walking 2 miles per day at 2 weeks post-operatively.  [x]  Walk on paved surfaces only. It is okay to walk up and down stairs while holding onto a side rail.  []  No sexual activity for 2-3 weeks.    Discharge Medication/Follow-up:  [x]  Please refer to discharge medication reconciliation form.  [x]  Do not take ANY non-steroidal anti-inflammatory drugs (NSAIDS), including the following: ibuprofen, naprosyn, Aleve, Advil, Indocin, Mobic, or Celebrex for:  []  4 weeks  [x]  8 weeks  []  6 months  [x]  Prescriptions for appropriate medication will be given upon discharge.   [x]  Pain control: oxycodone          []  Other:             [x]  Take docusate (Colace 100 mg): take one capsule a day as needed for constipation. You can get this over the counter.  [x]  Follow-up appointment:  []  10-14 days post-op for wound check by physician assistant/nurse  []  On 3/30 with Dr. Kidd:  [x]  with CT / MRI  []  without CT / MRI  [x]  An appointment will be mailed to you.    Wound Care:  []  Remove dressing or bandaid in    days.  [x]  No bandage required. Keep your incision open to the air.  [x]  You may shower on the 2nd day after your surgery. Have the force of water hit you opposite from the incision. Pat the incision dry after your shower; do not scrub the incision.  [x]  You cannot take a bath until 8 weeks after surgery.  [x]  Apply bacitracin to incision twice a day for 10 more days.    Call your doctor or go to the Emergency Room for any signs of infection, including: increased redness, drainage, pain, or fever (temperature ?101.5 for 24 hours). Call  "your doctor or go to the Emergency Room if there are any localized neurological changes; problems with speech, vision, numbness, tingling, weakness, or severe headache; or for other concerns.    Special Instructions:  []  No use of tobacco products.  []  Diet: Please eat a regular diet as tolerated.  []  Other diet:              Specific physician instructions: Continue cortef 10mg every night      Physicians need 3 days' notice for pain medicine to be refilled. Pain medicine will only be refilled between 8 AM and 5 PM, Monday through Friday, due to Food and Drug Administration regulation of documentation.    If you have any questions about this form, please call [phone].    Form No. 50345 (Revised 10/31/2013)        Primary Diagnosis     Your primary diagnosis was:  Brain Tumor      Admission Information     Date & Time Provider Department CSN    3/15/2017  6:11 AM Maximiliano Kidd MD [facility] [id number]      Care Providers     Provider Role Specialty Primary office phone    Maximiliano Kidd MD Attending Provider Neurosurgery [phone]    Maximiliano Kidd MD Surgeon  Neurosurgery [phone]      Your Vitals Were     BP Pulse Temp Resp    105/65 (BP Location: Right arm, Patient Position: Sitting, BP Method: Automatic) 84 97.9 °F (36.6 °C) (Oral) 16    Height Weight SpO2 BMI    5' 10" (1.778 m) 89.2 kg (196 lb 10.4 oz) 99% 28.22 kg/m2      Recent Lab Values     No lab values to display.      Pending Labs     Order Current Status    Sodium In process    Sodium In process    Specimen to Pathology - Surgery In process      Allergies as of 3/24/2017     No Known Allergies      Advance Directives     An advance directive is a document which, in the event you are no longer able to make decisions for yourself, tells your healthcare team what kind of treatment you do or do not want to receive, or who you would like to make those decisions for you.  If you do not currently have an advance directive, " [facility] encourages you to create one.  For more information call:  [phone]), [phone] ([phone]),  or log on to [email or web address].        Language Assistance Services     ATTENTION: Language assistance services are available, free of charge. Please call [phone].      ATENCIÓN: Si habla español, tiene a ortez disposición servicios gratuitos de asistencia lingüística. Llame al [phone].     [facility] Ý: N?u b?n nói Ti?ng Vi?t, có các d?ch v? h? tr? ngôn ng? mi?n phí dành cho b?n. G?i s? [phone].        [facility] Sign-Up     Activating your [facility] account is as easy as 1-2-3!     1) Visit [email or web address], select Sign Up Now, enter this activation code and your date of birth, then select Next.  [email or web address]  Expires: 5/8/2017 12:05 PM      2) Create a username and password to use when you visit [facility] in the future and select a security question in case you lose your password and select Next.    3) Enter your e-mail address and click Sign Up!    Additional Information  If you have questions, please e-mail [email or web address] or call [phone] to talk to our [facility] staff. Remember, [facility] is NOT to be used for urgent needs. For medical emergencies, dial 911.          [facility] complies with applicable Federal civil rights laws and does not discriminate on the basis of race, color, national origin, age, disability, or sex.

## 2017-03-15 NOTE — BRIEF OP NOTE
[facility]  Brief Operative Note    SUMMARY     Surgery Date: 3/15/2017     Surgeon(s) and Role:     * Maximiliano Kidd MD - Primary     * Alex Moe MD - Resident - Assisting        Pre-op Diagnosis:  Benign tumor of pituitary gland [D35.2]    Post-op Diagnosis:  Post-Op Diagnosis Codes:     * Benign tumor of pituitary gland [D35.2]    Procedure:  Right frontotemporal craniotomy excision of sellar tumor with neuronavigation and microsurgery    Anesthesia: General    Description of Procedure: pterional approach to sellar area    Description of the findings of the procedure: Tumor beneath right optic nerve and chiasm, pituitary adenoma by frozen section.    Estimated Blood Loss: 200 mL         Specimens:   Specimen (12h ago through future)    Start     Ordered    03/15/17 1259  Specimen to Pathology - Surgery  Once     Comments:  1.) Sella Tumor - Frozen - Sent to Pathology  2.) Sella Tumor - Perm - Placed in Surgical Refrigerator    03/15/17 8528

## 2017-03-15 NOTE — H&P
History & Physical  Neurocritical Care    Admit Date: 3/15/2017  LOS: 0    Code Status: Full Code     CC: Brain tumor    SUBJECTIVE:     History of Present Illness:  Mr. Roldan Moulton is a 40yo male with past history significant for pituitary adenoma s/p transphenoidal resection on 06/24/2015. 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 He is otherwise healthy, denies HTN, DMII. He does get testosterone injections. Of note, patient had DI post surgically last time which was treated with DDAVP.     He is being admitted to Neuro ICU s/p R frontotemporal craniotomy for excision of sellar tumour with Dr. Kidd.         History reviewed. No pertinent past medical history.  Past Surgical History:   Procedure Laterality Date    BRAIN SURGERY      HERNIA REPAIR  2006    NASAL SINUS SURGERY       No current facility-administered medications on file prior to encounter.      Current Outpatient Prescriptions on File Prior to Encounter   Medication Sig Dispense Refill    testosterone cypionate (DEPOTESTOTERONE CYPIONATE) 100 mg/mL injection take 1 milliliter intramuscularly every week  0     Review of patient's allergies indicates:  No Known Allergies  History reviewed. No pertinent family history.  Social History   Substance Use Topics    Smoking status: Current Every Day Smoker     Packs/day: 0.25     Years: 15.00     Types: Cigarettes    Smokeless tobacco: None    Alcohol use Yes      Comment: social      Review of Symptoms:  Constitutional: Denies fevers, weight loss, chills, or weakness.  Eyes: Denies changes in vision.  ENT: Denies dysphagia, nasal discharge, ear pain or discharge.  Cardiovascular: Denies chest pain, palpitations,  orthopnea, or claudication.  Respiratory: Denies shortness of breath, cough, hemoptysis, or wheezing.  GI: Denies nausea/vomitting, hematochezia, melena, abd pain, or changes in appetite.  : Denies dysuria, incontinence, or hematuria.  Musculoskeletal: Denies joint pain or myalgias.  Skin/breast: Denies rashes, lumps, lesions, or discharge.  Neurologic: Denies headache, dizziness, vertigo, or paresthesias.  Psychiatric: Denies changes in mood or hallucinations.  Endocrine: Denies polyuria, polydipsia, heat/cold intolerance.  Hematologic/Lymph: Denies lymphadenopathy, easy bruising or easy bleeding.  Allergic/Immunologic: Denies rash, rhinitis.     OBJECTIVE:   Vital Signs (Most Recent):   Temp: 98.3 °F (36.8 °C) (03/15/17 0648)  Pulse: 103 (03/15/17 1545)  Resp: 18 (03/15/17 1545)  BP: 138/66 (03/15/17 1545)  SpO2: (!) 92 % (03/15/17 1545)    Vital Signs (24h Range):   Temp:  [98.3 °F (36.8 °C)] 98.3 °F (36.8 °C)  Pulse:  [] 103  Resp:  [15-30] 18  SpO2:  [92 %-100 %] 92 %  BP: (117-153)/(66-85) 138/66  Arterial Line BP: (152-177)/(72-97) 152/72    ICP/CPP (Last 24h):        I & O (Last 24h):    Intake/Output Summary (Last 24 hours) at 03/15/17 1555  Last data filed at 03/15/17 1325   Gross per 24 hour   Intake             2100 ml   Output             1435 ml   Net              665 ml     Physical Exam:  GA: Alert, comfortable, no acute distress.   HEENT: No scleral icterus or JVD.   Pulmonary: Clear to auscultation A/P/L. No wheezing, crackles, or rhonchi.  Cardiac: RRR S1 & S2 w/o rubs/murmurs/gallops.   Abdominal: Bowel sounds present x 4. No appreciable hepatosplenomegaly.  Skin: No jaundice, rashes, or visible lesions.  Neuro:  --GCS: E4 V5 M6  --Mental Status:  AOx4  --CN II-XII grossly intact.   --Pupils 4mm, PERRL.   --Corneal reflex, gag, cough intact.  --LUE strength: 5/5  --RUE strength: 5/5  --LLE strength: 5/5  --RLE strength: 5/5    Vent Data:        Lines/Drains/Airway:               "Closed/Suction Drain 03/15/17 1321 Right Scalp Accordion 10 Fr. (Active)   Site Description Unable to view 3/15/2017  2:00 PM   Dressing Type Other (Comment) 3/15/2017  2:00 PM   Dressing Status Clean;Dry 3/15/2017  2:00 PM   Drainage Serosanguineous 3/15/2017  2:00 PM   Status To bulb suction 3/15/2017  2:00 PM            Urethral Catheter 03/15/17 0823 Straight-tip;Non-latex 16 Fr. (Active)   Site Assessment Intact;Clean 3/15/2017  2:00 PM   Collection Container Urimeter 3/15/2017  2:00 PM   Securement Method secured to top of thigh w/ adhesive device 3/15/2017  2:00 PM   Reason for Continuing Urinary Catheterization Post operative 3/15/2017  2:00 PM   CAUTI Prevention Bundle StatLock in place w 1" slack;Intact seal between catheter & drainage tubing;Drainage bag off the floor;No dependent loops or kinks;Drainage bag not overfilled (<2/3 full);Drainage bag below bladder 3/15/2017  2:00 PM     Nutrition/Tube Feeds:   Current Diet Order   Procedures    Diet Adult Regular       Labs:  ABG: No results for input(s): PH, PO2, PCO2, HCO3, POCSATURATED, BE in the last 24 hours.  BMP:No results for input(s): NA, K, CL, CO2, BUN, CREATININE, GLU, MG, PHOS in the last 24 hours.  LFT: No results found for: AST, ALT, GGT, ALKPHOS, BILITOT, ALBUMIN, PROT  CBC: No results found for: WBC, HGB, HCT, MCV, PLT  Microbiology x 7d:   Microbiology Results (last 7 days)     ** No results found for the last 168 hours. **        Imaging:  Imaging Results     None            ASSESSMENT/PLAN:     Patient Active Problem List    Diagnosis Date Noted    Brain tumor 03/15/2017     Neuro:   POD 0 s/p R frontotemporal craniotomy excision of sellar tumor  - pathology from previous resection showing pituitary adenoma  - SBP goal <140, cardene gtt  - PRN pain control with fentanyl and tylenol  - q1h neuro checks  - NSGY following  - continue post op ancef while drains in place  - continue hydrocort taper  - continue Keppra 500mg BID  - CT head " tomorrow AM (ordered)     Pulmonary:   - patient on RA  - SpO2 >94%    Cardiac:   - SBP goal <140, cardene gtt   - sinus tachycardia  - no other issues, continue to monitor    Renal:    - obtain CMP today  - had DI post operatively last time, treated with DDAVP  - monitor UOP, if >300cc for two consecutive hours and Na>145 will treat  - Na q6h    ID:   - afebrile, follow-up CBC  - continue post op Ancef while drains in situ    Hem/Onc:   - f/u CBC  - INR 0.9    Endocrine:    - continue to monitor BG      Fluids/Electrolytes/Nutrition/GI:   - resume diet as tolerated  - Na q6h for DI    Patient discussed with Dr. Rick with attestation to follow      Signing Physician:  Echo Lang MD   Internal Medicine PGY-1

## 2017-03-15 NOTE — PROGRESS NOTES
Labs and urine sent. Dr Kidd at bedside and patient assessed, neuro intact. B/p is 130s-140's SBP. Will continue to monitor. Dr Kidd to go and speak with his wife who is in the waiting room.

## 2017-03-15 NOTE — NURSING TRANSFER
Nursing Transfer Note      3/15/2017     Transfer To: [id number]    Transfer via bed    Transfer with cardiac monitoring    Transported by RN, PCT    Medicines sent: Cardene gtt    Chart send with patient: Yes    Notified: spouse    Patient reassessed at: 3/15/17, 1800    Upon arrival to floor: cardiac monitor applied, patient oriented to room, call bell in reach and bed in lowest position

## 2017-03-15 NOTE — ANESTHESIA PREPROCEDURE EVALUATION
03/15/2017  Roldan Moulton is a 41 y.o., male.    [facility] Anesthesia Evaluation    I have reviewed the Patient Summary Reports.     I have reviewed the Medications.     Review of Systems  Anesthesia Hx:  No problems with previous Anesthesia  History of prior surgery of interest to airway management or planning: Previous anesthesia: General   Social:  Smoker, Social Alcohol Use    Hematology/Oncology:  Hematology Normal   Oncology Normal     EENT/Dental:EENT/Dental Normal   Cardiovascular:  Cardiovascular Normal Exercise tolerance: good     Pulmonary:  Pulmonary Normal    Renal/:  Renal/ Normal     Hepatic/GI:  Hepatic/GI Normal    Musculoskeletal:  Musculoskeletal Normal    Neurological:  Neurology Normal    Endocrine:  Endocrine Normal    Dermatological:  Skin Normal    Psych:  Psychiatric Normal           Physical Exam  General:  Well nourished    Airway/Jaw/Neck:  Airway Findings: Mouth Opening: Normal Tongue: Normal  General Airway Assessment: Adult  Mallampati: II  TM Distance: Normal, at least 6 cm  Jaw/Neck Findings:  Neck ROM: Normal ROM      Dental:  Dental Findings: In tact        Mental Status:  Mental Status Findings:  Cooperative, Alert and Oriented         Anesthesia Plan  Type of Anesthesia, risks & benefits discussed:  Anesthesia Type:  general  Patient's Preference: GA  Intra-op Monitoring Plan: standard ASA monitors and arterial line  Intra-op Monitoring Plan Comments:   Post Op Pain Control Plan:   Post Op Pain Control Plan Comments:   Induction:   IV  Beta Blocker:  Patient is not currently on a Beta-Blocker (No further documentation required).       Informed Consent: Patient understands risks and agrees with Anesthesia plan.  Questions answered. Anesthesia consent signed with patient.  ASA Score: 2     Day of Surgery Review of History & Physical:  There are no significant changes.  H&P  update referred to the surgeon.         Ready For Surgery From Anesthesia Perspective.

## 2017-03-15 NOTE — H&P
Roldan Moulton was seen in neurosurgical consultation at the office this morning.   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 He was not rendered unconscious, but was  lightheaded and complained of headache. He was seen in an Emergency Room where  a CT scan and then subsequent MRI showed a Sellar lesion. This was felt to be   most consistent with pituitary adenoma. You saw him at that time. The tumor   was compressing the optic chiasm and somewhat eccentric to the right. He   underwent transsphenoidal resection of the tumor on 06/24/15. It was noted at   that time that the tumor involved the cavernous sinus. Postoperatively, the   patient states that he awoke with blurring of vision in the right eye. A   followup CT and MRI showed a hemorrhage into the sella and he returned to the   Operating Room for a transsphenoidal reoperation and removal of the blood. The   patient subsequently showed improvement in visual acuity in the right eye, but   has been left with a left homonymous hemianopsia, which was partially present   preoperatively. Pathology was apparently consistent with pituitary adenoma.   The patient developed diabetes insipidus in the postop period and was treated   with DDAVP. This gradually resolved. He apparently has had stable hormonal   status except for low testosterone and this has been managed with testosterone   injection. The patient has subsequent studies done showing residual tumor in   the suprasellar area and ongoing compression of the right chiasm and optic   nerves and he was referred for additional neurosurgical consultation. At this   point, he has complained of intermittent headache. These have been severe in   the past but seem better controlled more recently. Formal ophthalmological   examinations have shown a fairly dense left homonymous hemianopsia. He feels   that his energy level is not as good as it was  and that he has gained weight,   which he has difficulty losing. Past medical history is otherwise unremarkable.  He has no other chronic medical illnesses such as diabetes or hypertension.   He is  and has children. He is a full-time naval personnel. Review of   systems is otherwise negative.     He is a well-developed, well-nourished white male who is alert and cooperative.   Examination of the head shows no tenderness over the scalp. Eyes show full   extraocular movements. The pupils are equal and generally reactive. He does   show optic pallor of both optic disks and left homonymous hemianopsia to   confrontation. As noted, he has brought recent formal visual field examination   with him confirming this. Hearing is good bilaterally. The neck is supple. On   neurological examination, he is speaking clearly, provides a good history.   Affect is unremarkable. Finger-to-nose was done well and gait is normal.   Cranial nerve examination is otherwise intact. He has normal facial sensation   and movement. The tongue protrudes in the midline. He shows good strength in   the extremities, normal sensation and symmetrical deep tendon reflexes.     His imaging studies were reviewed. The MRI of the brain and selective views of   the sella turcica were done at [facility] on 04/30/15. There is a   Sellar tumor, which is primarily suprasellar involving the cavernous sinus   surrounding the carotid artery coming up to the optic chiasm primarily on the   right toward the sylvian fissure the sella is only mildly enlarged.   Postoperative MRI studies of the brain done on 05/17/16 and 11/16/16 show   residual tumor in the right suprasellar area elevating and thinning the right   optic nerve and chiasm. The central part of the tumor has been decompressed.   The pituitary stalk is recognized and what appears to be pituitary gland in the   left side of the sella.     IMPRESSION: Pituitary adenoma.    Plan proceed  with resection of tumor

## 2017-03-16 PROBLEM — E23.2 PRIMARY CENTRAL DIABETES INSIPIDUS: Status: ACTIVE | Noted: 2017-03-16

## 2017-03-16 PROBLEM — E29.1 SECONDARY MALE HYPOGONADISM: Status: ACTIVE | Noted: 2017-03-16

## 2017-03-16 PROBLEM — D35.2 PITUITARY ADENOMA: Status: ACTIVE | Noted: 2017-03-16

## 2017-03-16 LAB
ALBUMIN SERPL BCP-MCNC: 3.7 G/DL
ALP SERPL-CCNC: 85 U/L
ALT SERPL W/O P-5'-P-CCNC: 39 U/L
ANION GAP SERPL CALC-SCNC: 13 MMOL/L
ANION GAP SERPL CALC-SCNC: 9 MMOL/L
AST SERPL-CCNC: 21 U/L
BASOPHILS # BLD AUTO: 0.01 K/UL
BASOPHILS NFR BLD: 0.1 %
BILIRUB SERPL-MCNC: 0.4 MG/DL
BUN SERPL-MCNC: 11 MG/DL
BUN SERPL-MCNC: 9 MG/DL
CALCIUM SERPL-MCNC: 8.6 MG/DL
CALCIUM SERPL-MCNC: 9.2 MG/DL
CHLORIDE SERPL-SCNC: 103 MMOL/L
CHLORIDE SERPL-SCNC: 110 MMOL/L
CO2 SERPL-SCNC: 23 MMOL/L
CO2 SERPL-SCNC: 25 MMOL/L
CREAT SERPL-MCNC: 0.8 MG/DL
CREAT SERPL-MCNC: 0.9 MG/DL
DIFFERENTIAL METHOD: ABNORMAL
EOSINOPHIL # BLD AUTO: 0 K/UL
EOSINOPHIL NFR BLD: 0 %
ERYTHROCYTE [DISTWIDTH] IN BLOOD BY AUTOMATED COUNT: 14 %
EST. GFR  (AFRICAN AMERICAN): >60 ML/MIN/1.73 M^2
EST. GFR  (AFRICAN AMERICAN): >60 ML/MIN/1.73 M^2
EST. GFR  (NON AFRICAN AMERICAN): >60 ML/MIN/1.73 M^2
EST. GFR  (NON AFRICAN AMERICAN): >60 ML/MIN/1.73 M^2
GLUCOSE SERPL-MCNC: 136 MG/DL
GLUCOSE SERPL-MCNC: 147 MG/DL
HCT VFR BLD AUTO: 48.3 %
HGB BLD-MCNC: 16.3 G/DL
INR PPP: 1
LYMPHOCYTES # BLD AUTO: 1.1 K/UL
LYMPHOCYTES NFR BLD: 5.9 %
MAGNESIUM SERPL-MCNC: 1.9 MG/DL
MCH RBC QN AUTO: 29.9 PG
MCHC RBC AUTO-ENTMCNC: 33.7 %
MCV RBC AUTO: 89 FL
MONOCYTES # BLD AUTO: 1.3 K/UL
MONOCYTES NFR BLD: 6.6 %
NEUTROPHILS # BLD AUTO: 16.7 K/UL
NEUTROPHILS NFR BLD: 87 %
PHOSPHATE SERPL-MCNC: 3.8 MG/DL
PLATELET # BLD AUTO: 299 K/UL
PMV BLD AUTO: 11.4 FL
POTASSIUM SERPL-SCNC: 3.5 MMOL/L
POTASSIUM SERPL-SCNC: 3.8 MMOL/L
PROLACTIN SERPL IA-MCNC: 0.8 NG/ML
PROT SERPL-MCNC: 6.8 G/DL
PROTHROMBIN TIME: 10.4 SEC
RBC # BLD AUTO: 5.45 M/UL
SODIUM SERPL-SCNC: 137 MMOL/L
SODIUM SERPL-SCNC: 140 MMOL/L
SODIUM SERPL-SCNC: 141 MMOL/L
SODIUM SERPL-SCNC: 146 MMOL/L
SODIUM SERPL-SCNC: 146 MMOL/L
SODIUM SERPL-SCNC: 149 MMOL/L
T4 FREE SERPL-MCNC: 1.02 NG/DL
TSH SERPL DL<=0.005 MIU/L-ACNC: 0.97 UIU/ML
WBC # BLD AUTO: 19.17 K/UL

## 2017-03-16 PROCEDURE — 84305 ASSAY OF SOMATOMEDIN: CPT

## 2017-03-16 PROCEDURE — 99233 SBSQ HOSP IP/OBS HIGH 50: CPT | Mod: ,,, | Performed by: PHYSICIAN ASSISTANT

## 2017-03-16 PROCEDURE — 99233 SBSQ HOSP IP/OBS HIGH 50: CPT | Mod: ,,, | Performed by: PSYCHIATRY & NEUROLOGY

## 2017-03-16 PROCEDURE — 99253 IP/OBS CNSLTJ NEW/EST LOW 45: CPT | Mod: ,,, | Performed by: INTERNAL MEDICINE

## 2017-03-16 PROCEDURE — 84146 ASSAY OF PROLACTIN: CPT

## 2017-03-16 PROCEDURE — 63600175 PHARM REV CODE 636 W HCPCS: Performed by: STUDENT IN AN ORGANIZED HEALTH CARE EDUCATION/TRAINING PROGRAM

## 2017-03-16 PROCEDURE — 84295 ASSAY OF SERUM SODIUM: CPT

## 2017-03-16 PROCEDURE — 97161 PT EVAL LOW COMPLEX 20 MIN: CPT

## 2017-03-16 PROCEDURE — 83735 ASSAY OF MAGNESIUM: CPT

## 2017-03-16 PROCEDURE — 25000003 PHARM REV CODE 250: Performed by: STUDENT IN AN ORGANIZED HEALTH CARE EDUCATION/TRAINING PROGRAM

## 2017-03-16 PROCEDURE — 80048 BASIC METABOLIC PNL TOTAL CA: CPT

## 2017-03-16 PROCEDURE — 84100 ASSAY OF PHOSPHORUS: CPT

## 2017-03-16 PROCEDURE — 85025 COMPLETE CBC W/AUTO DIFF WBC: CPT

## 2017-03-16 PROCEDURE — 84439 ASSAY OF FREE THYROXINE: CPT

## 2017-03-16 PROCEDURE — 80053 COMPREHEN METABOLIC PANEL: CPT

## 2017-03-16 PROCEDURE — 25000003 PHARM REV CODE 250: Performed by: PHYSICIAN ASSISTANT

## 2017-03-16 PROCEDURE — 20000000 HC ICU ROOM

## 2017-03-16 PROCEDURE — 97165 OT EVAL LOW COMPLEX 30 MIN: CPT

## 2017-03-16 PROCEDURE — 85610 PROTHROMBIN TIME: CPT

## 2017-03-16 PROCEDURE — 84443 ASSAY THYROID STIM HORMONE: CPT

## 2017-03-16 PROCEDURE — 30000890 MAYO MISCELLANEOUS TEST (REFLEX)

## 2017-03-16 RX ORDER — HEPARIN SODIUM 5000 [USP'U]/ML
5000 INJECTION, SOLUTION INTRAVENOUS; SUBCUTANEOUS EVERY 8 HOURS
Status: DISCONTINUED | OUTPATIENT
Start: 2017-03-17 | End: 2017-03-24 | Stop reason: HOSPADM

## 2017-03-16 RX ORDER — AMOXICILLIN 250 MG
1 CAPSULE ORAL DAILY
Status: DISCONTINUED | OUTPATIENT
Start: 2017-03-16 | End: 2017-03-22

## 2017-03-16 RX ADMIN — LEVETIRACETAM 500 MG: 500 TABLET, FILM COATED ORAL at 08:03

## 2017-03-16 RX ADMIN — HYDROCORTISONE 50 MG: 20 TABLET ORAL at 09:03

## 2017-03-16 RX ADMIN — LEVETIRACETAM 500 MG: 500 TABLET, FILM COATED ORAL at 09:03

## 2017-03-16 RX ADMIN — Medication 3 ML: at 06:03

## 2017-03-16 RX ADMIN — Medication 3 ML: at 01:03

## 2017-03-16 RX ADMIN — STANDARDIZED SENNA CONCENTRATE AND DOCUSATE SODIUM 1 TABLET: 8.6; 5 TABLET, FILM COATED ORAL at 12:03

## 2017-03-16 RX ADMIN — OXYCODONE HYDROCHLORIDE AND ACETAMINOPHEN 1 TABLET: 5; 325 TABLET ORAL at 06:03

## 2017-03-16 RX ADMIN — CEFAZOLIN SODIUM 1 G: 1 SOLUTION INTRAVENOUS at 04:03

## 2017-03-16 RX ADMIN — OXYCODONE HYDROCHLORIDE AND ACETAMINOPHEN 1 TABLET: 5; 325 TABLET ORAL at 01:03

## 2017-03-16 RX ADMIN — OXYCODONE HYDROCHLORIDE AND ACETAMINOPHEN 1 TABLET: 5; 325 TABLET ORAL at 12:03

## 2017-03-16 RX ADMIN — CEFAZOLIN SODIUM 1 G: 1 SOLUTION INTRAVENOUS at 08:03

## 2017-03-16 RX ADMIN — Medication 3 ML: at 09:03

## 2017-03-16 RX ADMIN — HYDROCORTISONE 100 MG: 20 TABLET ORAL at 08:03

## 2017-03-16 RX ADMIN — OXYCODONE HYDROCHLORIDE AND ACETAMINOPHEN 1 TABLET: 5; 325 TABLET ORAL at 07:03

## 2017-03-16 RX ADMIN — NICARDIPINE HYDROCHLORIDE 7.5 MG/HR: 0.2 INJECTION, SOLUTION INTRAVENOUS at 02:03

## 2017-03-16 NOTE — ASSESSMENT & PLAN NOTE
Recommend patient have strict i/o  -DDAVP 10 mcg intransal prn if patient with two of the three parameters in place (Serum NA >145, Urine Spec gravity <1.005, and Two consecutive urine o/p greater than 250 cc/hr)

## 2017-03-16 NOTE — CONSULTS
[facility]  Endocrinology  Consult Note    Consult Requested by: Maximiliano Kidd MD   Reason for admit: Brain tumor    HISTORY OF PRESENT ILLNESS:  Consult requested by: WALLY Kidd MD    Reason for Consult: DI    HPI:  Patient is a 42 y/o male s/p craniotomy for recurrent pituitary adenoma.  Underwent initial Transphenoidal Resection in 6/24/2015 and subsequent revision.  This is his third procedure.  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  He developed post-operative DI after his second procedure and discharged on DDAVP.  He has had first two procedures completed at [facility] in [place].  His wife is unsure if he is still taking this medication.  He also developed secondary hypogonadism and take Testosterone Cypionate 100 mg q week.  Post operatively, patient has DI and has received one dose of DDAVP 10 mcg intrranasally          Medications and/or Treatments Impacting Glycemic Control:  Immunotherapy:    Immunosuppressants     None        Steroids:   Hormones     Start     Stop Route Frequency Ordered    03/16/17 2100  hydrocortisone tablet 50 mg      03/17 2059 Oral 2 times daily 03/15/17 1437    03/18/17 0645  hydrocortisone tablet 20 mg      03/19 0644 Oral Before breakfast 03/15/17 1437    03/18/17 2100  hydrocortisone tablet 10 mg      -- Oral Nightly 03/15/17 1437        Pressors:    Autonomic Drugs     None          Prescriptions Prior to Admission   Medication Sig Dispense Refill Last Dose    testosterone cypionate (DEPOTESTOTERONE CYPIONATE) 100 mg/mL injection take 1 milliliter intramuscularly every week  0 3/12/2017 at 2030       Current Facility-Administered Medications   Medication Dose Route Frequency Provider Last Rate Last Dose    ceFAZolin (ANCEF) 1 gram in dextrose 5 % 50 mL IVPB (premix)  1 g Intravenous Q8H Alex Moe  mL/hr at 03/16/17 0800 1 g at 03/16/17 0800    [START ON 3/17/2017] heparin  "(porcine) injection 5,000 Units  5,000 Units Subcutaneous Q8H Shannon Morris PA-C        [START ON 3/18/2017] hydrocortisone tablet 10 mg  10 mg Oral QHS Alex Moe MD        hydrocortisone tablet 50 mg  50 mg Oral BID Alex Moe MD        Followed by    [START ON 3/18/2017] hydrocortisone tablet 20 mg  20 mg Oral Before breakfast Alex Moe MD        levetiracetam tablet 500 mg  500 mg Oral BID Alex Moe MD   500 mg at 03/16/17 0833    ondansetron injection 8 mg  8 mg Intravenous Q8H PRN Alex Moe MD        oxycodone-acetaminophen 5-325 mg per tablet 1 tablet  1 tablet Oral Q6H PRN Erick Pineda MD   1 tablet at 03/16/17 0636    senna-docusate 8.6-50 mg per tablet 1 tablet  1 tablet Oral Daily Shannon Morris PA-C        sodium chloride 0.9% flush 3 mL  3 mL Intravenous PRN Jeffrey Larsen MD        sodium chloride 0.9% flush 3 mL  3 mL Intravenous Q8H Echo Lang MD   3 mL at 03/16/17 0602           Unable to perform ROS due to sedation    Review of Systems      Recent Labs  Lab 03/16/17  0303 03/16/17  0836   *  --    CALCIUM 9.2  --    ALBUMIN 3.7  --    PROT 6.8  --    *  146* 141   K 3.5  --    CO2 23  --      --    BUN 9  --    CREATININE 0.9  --    ALKPHOS 85  --    ALT 39  --    AST 21  --    BILITOT 0.4  --      No results found for: HGBA1C    Nutritional status:   Body mass index is 28.22 kg/(m^2).  Lab Results   Component Value Date    ALBUMIN 3.7 03/16/2017    ALBUMIN 3.8 03/15/2017     No results found for: PREALBUMIN    Estimated Creatinine Clearance: 123.9 mL/min (based on Cr of 0.9).    PHYSICAL EXAMINATION:  Vitals:    03/16/17 1100   BP: (!) 110/57   Pulse: 70   Resp: (!) 23   Temp: 99 °F (37.2 °C)     Body mass index is 28.22 kg/(m^2).    Physical Exam        PHYSICAL EXAMINATION  BP (!) 110/57  Pulse 70  Temp 99 °F (37.2 °C) (Oral)   Resp (!) 23  Ht 5' 10" (1.778 m)  Wt 89.2 kg (196 lb 10.4 oz)  SpO2 (!) 94%  BMI " 28.22 kg/m2  Constitutional:  Well developed, well nourished, NAD.  ENT: External ears no masses with nose patent; unable to asses hearing.   Neck:  Supple; trachea midline; no thyromegaly.   Cardiovascular: Normal heart sounds, no LE edema.     Lungs:  Normal effort; lungs anterior bilaterally clear to auscultation.  Abdomen:  Soft, no masses,  no hernias.  MS: No clubbing or cyanosis of nails noted; unable to assess gait.  Skin: craniotomy incision c/d/i.  Drain in place  Psychiatric: RASS 0  Neurological: Cranial nerves are grossly intact.           Nutritional status:   Body mass index is 28.22 kg/(m^2).  Lab Results   Component Value Date    ALBUMIN 3.7 03/16/2017    ALBUMIN 3.8 03/15/2017     No results found for: PREALBUMIN  eatments Impacting Glycemic Control  Immunotherapy:  Immunosuppressants     None        Steroids:   Hormones     Start     Stop Route Frequency Ordered    03/16/17 2100  hydrocortisone tablet 50 mg      03/17 2059 Oral 2 times daily 03/15/17 1437    03/18/17 0645  hydrocortisone tablet 20 mg      03/19 0644 Oral Before breakfast 03/15/17 1437    03/18/17 2100  hydrocortisone tablet 10 mg      -- Oral Nightly 03/15/17 1437        Pressors:    Autonomic Drugs     None        Hyperglycemia/Diabetes Medications: Antihyperglycemics     None      .     ASSESSMENT and PLAN:    Pituitary adenoma  S/p craniotomy   -On hydrocortisone taper per Neurosurgery  -Will evaluate Prolactin, IGF-1 and TSH level.  ACTH, Cortisol will be unrevealing as patient is on steroids.  Patient on Testosterone supplementation so FSH, LH will be unrevealing.       Primary central diabetes insipidus  Recommend patient have strict i/o  -DDAVP 10 mcg intransal prn if patient with two of the three parameters in place (Serum NA >145, Urine Spec gravity <1.005, and Two consecutive urine o/p greater than 250 cc/hr)  -BMP q6      Secondary male hypogonadism  Hold testosterone while inpatient as increased risk of dvt.  -Can  resume o/p        DISCHARGE NEEDS: will assess daily    Rebeka Horowitz MD  Endocrinology  [facility]

## 2017-03-16 NOTE — PLAN OF CARE
Problem: Patient Care Overview  Goal: Plan of Care Review  Outcome: Ongoing (interventions implemented as appropriate)  POC reviewed with pt at 0500. Pt verbalized understanding. Questions and concerns addressed. No acute events overnight. Pt progressing toward goals. Will continue to monitor. See flowsheets for full assessment and VS info

## 2017-03-16 NOTE — PT/OT/SLP EVAL
Occupational Therapy  Evaluation    Roldan Moulton   MRN: [id number]   Admitting Diagnosis: Brain tumor    OT Date of Treatment: 17   OT Start Time: 834  OT Stop Time: 848  OT Total Time (min): 14 min    Billable Minutes:  Evaluation 14  *Completed with PT    Diagnosis: Brain tumor     History reviewed. No pertinent past medical history.   Past Surgical History:   Procedure Laterality Date    BRAIN SURGERY      HERNIA REPAIR      NASAL SINUS SURGERY         Referring physician: Alex Moe MD  Date referred to OT: 3/15/2017    General Precautions: Standard, fall  Orthopedic Precautions: N/A  Braces: N/A    Do you have any cultural, spiritual, Confucianism conflicts, given your current situation?: none reported     Patient History:  Living Environment  Lives With: spouse  Living Arrangements: house  Transportation Available: family or friend will provide  Living Environment Comment: Pt lives with wife in [address] [address]; bathroom contains tub/shower combination.  PTA pt reports being (I) with all ADLs and mobility-no DME; active in [employer].  Wife able to provide assist upon d/c.   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    Chief Complaint: headache  Patient/Family stated goals: return home    Pain Ratin/10  Location - Side: Bilateral  Location - Orientation: generalized  Location:  (head)     Pain Rating Post-Intervention: 8/10    Objective:  Patient found with: peripheral IV, arterial line, telemetry, oxygen, pulse ox (continuous), blood pressure cuff, de guzman catheter (subgaleal  drain)    Cognitive Exam:  Oriented to: Person, Place, Time and Situation  Follows Commands/attention: Follows multistep  commands  Communication: clear/fluent  Memory:  No Deficits noted  Safety awareness/insight to disability: intact  Coping skills/emotional control: Appropriate to situation    Visual/perceptual:  Intact    Physical Exam:  Postural examination/scapula alignment: No postural abnormalities identified  Skin integrity: Visible skin intact  Edema: None noted     Sensation:   Intact    Upper Extremity Range of Motion:  Right Upper Extremity: WNL  Left Upper Extremity: WNL    Upper Extremity Strength:  Right Upper Extremity: WNL  Left Upper Extremity: WNL   Strength: 4/5 both hands    Fine motor coordination:   Intact    Gross motor coordination: WFL    Functional Mobility:  Bed Mobility:  Rolling/Turning to Left: Stand by assistance  Rolling/Turning Right: Stand by assistance  Scooting/Bridging: Stand by Assistance  Supine to Sit: Stand by Assistance  Sit to Supine: Stand by Assistance    Transfers:  Sit <> Stand Assistance: Contact Guard Assistance x 1 trial from EOB  Sit <> Stand Assistive Device: No Assistive Device    Functional Ambulation: Pt took 2 steps forward, 2 steps back, and 2 side steps to left towards HOB with CGA.  Mild postural sway noted; no instances of LOB.    Activities of Daily Living:    Grooming Position:  HOB elevated; side lying  Grooming Level of Assistance: Supervision for washing face with cool cloth utilizing RUE    Balance:   Static Sit: GOOD: Takes MODERATE challenges from all directions  Dynamic Sit: GOOD: Maintains balance through MODERATE excursions of active trunk movement  Static Stand: FAIR: Maintains without assist but unable to take challenges  Dynamic stand: FAIR: Needs CONTACT GUARD during gait    Therapeutic Activities and Exercises:  *Pt educated on role of OT/PT and POC discussed  *Whiteboard updated    AM-PAC 6 CLICK ADL  How much help from another person  "does this patient currently need?  1 = Unable, Total/Dependent Assistance  2 = A lot, Maximum/Moderate Assistance  3 = A little, Minimum/Contact Guard/Supervision  4 = None, Modified [place]/Independent    Putting on and taking off regular lower body clothing? : 3  Bathing (including washing, rinsing, drying)?: 3  Toileting, which includes using toilet, bedpan, or urinal? : 3  Putting on and taking off regular upper body clothing?: 3  Taking care of personal grooming such as brushing teeth?: 3  Eating meals?: 4  Total Score: 19    AM-PAC Raw Score CMS "G-Code Modifier Level of Impairment Assistance   6 % Total / Unable   7 - 9 CM 80 - 100% Maximal Assist   10 - 14 CL 60 - 80% Moderate Assist   15 - 19 CK 40 - 60% Moderate Assist   20 - 22 CJ 20 - 40% Minimal Assist   23 CI 1-20% SBA / CGA   24 CH 0% Independent/ Mod I       Patient left HOB elevated; left side lying with all lines intact, call button in reach and wife present    Assessment:  Roldan Moulton is a 41 y.o. male with a medical diagnosis of Brain tumor and presents with severe headache, decreased endurance, and impaired balance in standing impacting performance with ADLs and mobility.  Pt demonstrates strength and ROM in (B) UE needed for ADLs that is WNL, and is able to take steps with CGA.  PTA pt reports being (I) with all ADLs and mobility.  Pt would benefit from skilled OT services to address problems listed below and increase independence with ADLs.  Once pain under control pt likely to progress well with therapy with no further therapy needs recommended upon d/c from acute care.      Rehab identified problem list/impairments: Rehab identified problem list/impairments: impaired endurance, impaired balance, impaired functional mobilty, pain, impaired self care skills    Rehab potential is good.    Activity tolerance: Fair    Discharge recommendations: Discharge Facility/Level Of Care Needs: home     Barriers to discharge: Barriers to " Discharge: None    Equipment recommendations:  (TBD pending further gait assessment)     GOALS:   Occupational Therapy Goals        Problem: Occupational Therapy Goal    Goal Priority Disciplines Outcome Interventions   Occupational Therapy Goal     OT, PT/OT     Description:  Goals to be met by: 3/23/2017    Patient will increase functional independence with ADLs by performing:    UE Dressing with Modified [place].  LE Dressing with Modified [place].  Grooming while standing at sink with Modified [place].  Toileting from toilet with Modified [place] for hygiene and clothing management.   Supine to sit with Modified [place].  Toilet transfer to toilet with Modified [place].                PLAN:  Patient to be seen 3 x/week to address the above listed problems via self-care/home management, therapeutic activities, therapeutic exercises  Plan of Care expires: 04/15/17  Plan of Care reviewed with: patient, spouse         GIDEON Shepherd  03/16/2017

## 2017-03-16 NOTE — PT/OT/SLP EVAL
Physical Therapy  Evaluation    Roldan Moulton   MRN: [id number]   Admitting Diagnosis: Brain tumor    PT Received On: 17  PT Start Time: 08     PT Stop Time: 0848    PT Total Time (min): 14 min       Billable Minutes:  Evaluation 14 min (Co-eval with OT)    Diagnosis: Brain tumor  S/p craniotomy     History reviewed. No pertinent past medical history.   Past Surgical History:   Procedure Laterality Date    BRAIN SURGERY      HERNIA REPAIR      NASAL SINUS SURGERY         Referring physician: Alex Moe MD  Date referred to PT: 3/15/17    General Precautions: Standard, fall  Orthopedic Precautions: N/A   Braces: N/A       Do you have any cultural, spiritual, Protestant conflicts, given your current situation?: no conflicts    Patient History:  Lives With: spouse  Living Arrangements: house  Transportation Available: family or friend will provide  Living Environment Comment: Pt reported living with wife in [facility] with no PILY, bathroom has tub shower. Pt owns no DME. Prior to admit, pt (I) with mobility and ADLs, active and serving in the [employer].   Equipment Currently Used at Home: none  DME owned (not currently used): none    Previous Level of Function:  Ambulation Skills: independent  Transfer Skills: independent  ADL Skills: independent  Work/Leisure Activity: independent    Subjective:  Communicated with RN prior to session. Pt agreeable to participate in therapy evaluation. Pt with reports of a headache. Pt's wife at bedside.     Chief Complaint: pain, decreased mobility   Patient goals: none stated     Pain Ratin/10   Location - Orientation: generalized  Location: head  Pain Addressed: Reposition, Nurse notified  Pain Rating Post-Intervention: 8/10    Objective:   Patient found with: peripheral IV, arterial line, telemetry, oxygen, pulse ox (continuous) (subgaleal drain)     Cognitive Exam:  Oriented to: Person, Place, Time and Situation    Follows Commands/attention: Follows multistep   commands  Communication: clear/fluent  Safety awareness/insight to disability: intact    Physical Exam:  Postural examination/scapula alignment: No postural abnormalities identified    Skin integrity: Visible skin intact  Edema: None noted BLEs    Sensation:   Intact  light/touch BLEs    Lower Extremity Range of Motion:  Right Lower Extremity: WFL  Left Lower Extremity: WFL    Lower Extremity Strength:  Right Lower Extremity: WFL  Left Lower Extremity: WFL    Gross motor coordination: WFL    Functional Mobility:  Bed Mobility:  Scooting/Bridging: Stand by Assistance  Supine to Sit: Stand by Assistance  Sit to Supine: Stand by Assistance    Transfers:  Sit <> Stand Assistance: Contact Guard Assistance (from EOB)  Sit <> Stand Assistive Device: No Assistive Device    Gait:   Gait Distance: Pt ambulated ~5 steps forward and backward, as well as ~3 side steps to L with CGA for safety; no LOB   Assistance 1: Contact Guard Assistance  Gait Assistive Device: No device  Gait Pattern: reciprocal  Gait Deviation(s): decreased bhargavi    Balance:   Static Sit: FAIR+: Able to take MINIMAL challenges from all directions  Dynamic Sit: FAIR+: Maintains balance through MINIMAL excursions of active trunk motion  Static Stand: FAIR+: Takes MINIMAL challenges from all directions  Dynamic stand: FAIR: Needs CONTACT GUARD during gait    Therapeutic Activities and Exercises:  Pt educated on role of PT and POC/goals for therapy as well as safety with mobility. Pt verbalized understanding. Pt expressed no further concerns/questions.     AM-PAC 6 CLICK MOBILITY  How much help from another person does this patient currently need?   1 = Unable, Total/Dependent Assistance  2 = A lot, Maximum/Moderate Assistance  3 = A little, Minimum/Contact Guard/Supervision  4 = None, Modified [place]/Independent    Turning over in bed (including adjusting bedclothes, sheets and blankets)?: 4  Sitting down on and standing up from a chair with arms  (e.g., wheelchair, bedside commode, etc.): 3  Moving from lying on back to sitting on the side of the bed?: 3  Moving to and from a bed to a chair (including a wheelchair)?: 3  Need to walk in hospital room?: 3  Climbing 3-5 steps with a railing?: 3  Total Score: 19     AM-PAC Raw Score CMS G-Code Modifier Level of Impairment Assistance   6 % Total / Unable   7 - 9 CM 80 - 100% Maximal Assist   10 - 14 CL 60 - 80% Moderate Assist   15 - 19 CK 40 - 60% Moderate Assist   20 - 22 CJ 20 - 40% Minimal Assist   23 CI 1-20% SBA / CGA   24 CH 0% Independent/ Mod I     Patient left supine with all lines intact, call button in reach, RN notified and wife present.    Assessment:   Roldan Moulton is a 41 y.o. male with a medical diagnosis of Brain tumor, s/p craniotomy. Pt presents with pain limiting activity tolerance, mild gait instability, fair overall functional mobility with bed mobility and transfers and WFL BLE strength. Pt would benefit from skilled PT intervention to address below listed deficits, reduce fall risk, and maximize (I) and safety with functional mobility. Gait assessment limited today 2/2 multiple medical lines- pt would benefit from additional session of gait training to assess need for AD and dynamic balance. Recommending pt discharge home with no further therapy needs anticipated.     Rehab identified problem list/impairments: Rehab identified problem list/impairments: impaired endurance, gait instability, impaired balance, impaired functional mobilty, pain    Rehab potential is good.    Activity tolerance: Good    Discharge recommendations: Discharge Facility/Level Of Care Needs: home     Barriers to discharge: Barriers to Discharge: None    Equipment recommendations: Equipment Needed After Discharge:  (TBD pending further gait assessment)     GOALS:   Physical Therapy Goals        Problem: Physical Therapy Goal    Goal Priority Disciplines Outcome Goal Variances Interventions   Physical Therapy  Goal     PT/OT, PT Ongoing (interventions implemented as appropriate)     Description:  Goals to be met by: 3/23/17     Patient will increase functional independence with mobility by performin. Supine to sit with Modified [place]  2. Sit to stand transfer with Modified [place]  3. Gait  x 150 feet with Supervision with or without appropriate AD.   4. Lower extremity exercise program x30 reps per handout, with assistance as needed                PLAN:    Patient to be seen 3 x/week to address the above listed problems via gait training, therapeutic activities, therapeutic exercises, neuromuscular re-education  Plan of Care expires: 04/15/17  Plan of Care reviewed with: patient, spouse        Mariana Mancia, PT, DPT   3/16/2017  Pager: [phone]

## 2017-03-16 NOTE — PROGRESS NOTES
Patient urine o/p 550 mL for past hour. Notified Dr. Pineda. MD awaiting serum Na+ and UA results. Also notified CRUZ Prasad of increased urine o/p and patient c/o HA pain 8/10. PA to place orders. Will continue to monitor.

## 2017-03-16 NOTE — SUBJECTIVE & OBJECTIVE
"    Unable to perform ROS due to sedation    Review of Systems      Recent Labs  Lab 03/16/17  0303 03/16/17  0836   *  --    CALCIUM 9.2  --    ALBUMIN 3.7  --    PROT 6.8  --    *  146* 141   K 3.5  --    CO2 23  --      --    BUN 9  --    CREATININE 0.9  --    ALKPHOS 85  --    ALT 39  --    AST 21  --    BILITOT 0.4  --      No results found for: HGBA1C    Nutritional status:   Body mass index is 28.22 kg/(m^2).  Lab Results   Component Value Date    ALBUMIN 3.7 03/16/2017    ALBUMIN 3.8 03/15/2017     No results found for: PREALBUMIN    Estimated Creatinine Clearance: 123.9 mL/min (based on Cr of 0.9).    PHYSICAL EXAMINATION:  Vitals:    03/16/17 1100   BP: (!) 110/57   Pulse: 70   Resp: (!) 23   Temp: 99 °F (37.2 °C)     Body mass index is 28.22 kg/(m^2).    Physical Exam        PHYSICAL EXAMINATION  BP (!) 110/57  Pulse 70  Temp 99 °F (37.2 °C) (Oral)   Resp (!) 23  Ht 5' 10" (1.778 m)  Wt 89.2 kg (196 lb 10.4 oz)  SpO2 (!) 94%  BMI 28.22 kg/m2  Constitutional:  Well developed, well nourished, NAD.  ENT: External ears no masses with nose patent; unable to asses hearing.   Neck:  Supple; trachea midline; no thyromegaly.   Cardiovascular: Normal heart sounds, no LE edema.     Lungs:  Normal effort; lungs anterior bilaterally clear to auscultation.  Abdomen:  Soft, no masses,  no hernias.  MS: No clubbing or cyanosis of nails noted; unable to assess gait.  Skin: craniotomy incision c/d/i.  Drain in place  Psychiatric: RASS 0  Neurological: Cranial nerves are grossly intact.           Nutritional status:   Body mass index is 28.22 kg/(m^2).  Lab Results   Component Value Date    ALBUMIN 3.7 03/16/2017    ALBUMIN 3.8 03/15/2017     No results found for: PREALBUMIN  eatments Impacting Glycemic Control  Immunotherapy:  Immunosuppressants     None        Steroids:   Hormones     Start     Stop Route Frequency Ordered    03/16/17 2100  hydrocortisone tablet 50 mg      03/17 2059 Oral 2 " times daily 03/15/17 1437    03/18/17 0645  hydrocortisone tablet 20 mg      03/19 0644 Oral Before breakfast 03/15/17 1437    03/18/17 2100  hydrocortisone tablet 10 mg      -- Oral Nightly 03/15/17 1437        Pressors:    Autonomic Drugs     None        Hyperglycemia/Diabetes Medications: Antihyperglycemics     None

## 2017-03-16 NOTE — PROGRESS NOTES
Patient transported to and from CT via bed on portable monitor and 3L O2 via N/C accompanied by RN x 2. Upon arrival back to room 7065 patient placed back on continuous bedside monitor and 2L O2 via N/C. Notified team. Patient tolerated well. Will continue to monitor.

## 2017-03-16 NOTE — PLAN OF CARE
Problem: Occupational Therapy Goal  Goal: Occupational Therapy Goal  Goals to be met by: 3/23/2017    Patient will increase functional independence with ADLs by performing:    UE Dressing with Modified [place].  LE Dressing with Modified [place].  Grooming while standing at sink with Modified [place].  Toileting from toilet with Modified [place] for hygiene and clothing management.   Supine to sit with Modified [place].  Toilet transfer to toilet with Modified [place].     OT evaluation complete and POC established.  No further therapy needs recommended upon d/c from acute care.     GIDEON Shepherd  3/16/2017

## 2017-03-16 NOTE — ANESTHESIA POSTPROCEDURE EVALUATION
"Anesthesia Post Evaluation    Patient: Roldan Moulton    Procedure(s) Performed: Procedure(s) (LRB):  CRANIOTOMY WITH STEALTH, RIGHT FRONTOTEMPORAL CRANIOTOMY for tumor (Right)    Final Anesthesia Type: general  Patient location during evaluation: ICU  Patient participation: Yes- Able to Participate  Level of consciousness: awake and alert  Post-procedure vital signs: reviewed and stable  Pain management: adequate  Airway patency: patent  PONV status at discharge: No PONV  Anesthetic complications: no      Cardiovascular status: blood pressure returned to baseline  Respiratory status: unassisted  Hydration status: euvolemic  Follow-up not needed.        Visit Vitals    BP (!) 110/57    Pulse 70    Temp 37.2 °C (99 °F) (Oral)    Resp (!) 23    Ht 5' 10" (1.778 m)    Wt 89.2 kg (196 lb 10.4 oz)    SpO2 (!) 94%    BMI 28.22 kg/m2       Pain/Shailesh Score: Pain Assessment Performed: Yes (3/16/2017  9:00 AM)  Presence of Pain: denies (3/16/2017  9:00 AM)  Pain Rating Prior to Med Admin: 7 (3/16/2017  6:36 AM)  Pain Rating Post Med Admin: 3 (3/16/2017  7:36 AM)  Shailesh Score: 10 (3/15/2017  6:00 PM)      "

## 2017-03-16 NOTE — ASSESSMENT & PLAN NOTE
S/p craniotomy   -On hydrocortisone taper per Neurosurgery  -Will evaluate Prolactin, IGF-1 and TSH level.  ACTH, Cortisol will be unrevealing as patient is on steroids.  Patient on Testosterone supplementation so FSH, LH will be unrevealing.

## 2017-03-16 NOTE — PROGRESS NOTES
Pt arrived to [facility] room 7079 from PACU S/P right craniotomy with subgaleal drain in place.Cardene drip infusing @ 5 mg/hr.C/o severe headache 10 on pain scale.Pt medicated for pain.Alert and oriented x4.No acute distress noted.

## 2017-03-16 NOTE — PROGRESS NOTES
Patient urine o/p 650 ML past hour, PO intake 700mL. Awaiting desmopressin from pharmacy. Notified Dr. Foreman and Dr. Pineda. Will continue to monitor.

## 2017-03-16 NOTE — PLAN OF CARE
Problem: Physical Therapy Goal  Goal: Physical Therapy Goal  Goals to be met by: 3/23/17     Patient will increase functional independence with mobility by performin. Supine to sit with Modified [place]  2. Sit to stand transfer with Modified [place]  3. Gait x 150 feet with Supervision with or without appropriate AD.   4. Lower extremity exercise program x30 reps per handout, with assistance as needed  Outcome: Ongoing (interventions implemented as appropriate)  Pt chart reviewed and PT evaluation completed- see note for details. POC initiated.      Mariana Mancia, PT, DPT   3/16/2017  Pager: [phone]

## 2017-03-16 NOTE — PROGRESS NOTES
Progress Note  Neurosurgery    Admit Date: 3/15/2017  Post-operative Day: 1 Day Post-Op  Hospital Day: 2    SUBJECTIVE:     Roldan Moulton is a 41 y.o. male s/p right frontotemporal crani for resection of sellar mass.  Follow-up For:  Procedure(s) (LRB):  CRANIOTOMY WITH STEALTH, RIGHT FRONTOTEMPORAL CRANIOTOMY for tumor (Right)    Went into DI overnight, urine output slowed with 10mcg of ddAVP.    Scheduled Meds:   ceFAZolin (ANCEF) IVPB  1 g Intravenous Q8H    [START ON 3/17/2017] heparin (porcine)  5,000 Units Subcutaneous Q8H    [START ON 3/18/2017] hydrocortisone  10 mg Oral QHS    hydrocortisone  50 mg Oral BID    Followed by    [START ON 3/18/2017] hydrocortisone  20 mg Oral Before breakfast    levetiracetam  500 mg Oral BID    senna-docusate 8.6-50 mg  1 tablet Oral Daily    sodium chloride 0.9%  3 mL Intravenous Q8H     Continuous Infusions:   PRN Meds:ondansetron, oxycodone-acetaminophen, sodium chloride 0.9%    Review of patient's allergies indicates:  No Known Allergies    OBJECTIVE:     Vital Signs (Most Recent)  Temp: 99 °F (37.2 °C) (03/16/17 1100)  Pulse: 70 (03/16/17 1100)  Resp: (!) 23 (03/16/17 1100)  BP: (!) 110/57 (03/16/17 1100)  SpO2: (!) 94 % (03/16/17 1100)    Vital Signs Range (Last 24H):  Temp:  [98 °F (36.7 °C)-99.2 °F (37.3 °C)]   Pulse:  []   Resp:  [14-30]   BP: ()/(48-85)   SpO2:  [91 %-100 %]   Arterial Line BP: ()/(58-97)     I & O (Last 24H):  Intake/Output Summary (Last 24 hours) at 03/16/17 1133  Last data filed at 03/16/17 1100   Gross per 24 hour   Intake           6391.7 ml   Output           6690.5 ml   Net           -298.8 ml     Physical Exam:  AAOX3, speech fluent  PERRL, EOMI, face symm, tongue midline  Left homonymous hemianopsia  CARLIN symm, FC x4  SILT  No drift    Lines/Drains:       Peripheral IV - Single Lumen 03/15/17 0725 Left Hand (Active)   Site Assessment Clean;Dry;Intact 3/16/2017  7:01 AM   Line Status Infusing 3/16/2017  7:01 AM    Dressing Status Clean;Dry;Intact 3/16/2017  7:01 AM   Dressing Intervention New dressing 3/15/2017  4:00 PM   Dressing Change Due 03/19/17 3/16/2017  3:05 AM   Site Change Due 03/19/17 3/16/2017  7:01 AM   Reason Not Rotated Not due 3/16/2017  3:05 AM   Number of days:1            Peripheral IV - Single Lumen 03/15/17 0826 Right Hand (Active)   Site Assessment Clean;Dry;Intact 3/16/2017  7:01 AM   Line Status Infusing 3/16/2017  7:01 AM   Dressing Status Clean;Dry;Intact 3/16/2017  7:01 AM   Dressing Intervention New dressing 3/15/2017  4:00 PM   Dressing Change Due 03/19/17 3/16/2017  3:05 AM   Site Change Due 03/19/17 3/16/2017  7:01 AM   Reason Not Rotated Not due 3/16/2017  3:05 AM   Number of days:1            Arterial Line 03/15/17 Left Radial (Active)   Site Assessment Clean;Dry;Intact;No redness;No swelling 3/16/2017  7:01 AM   Line Status Pulsatile blood flow 3/16/2017  7:01 AM   Art Line Waveform Appropriate 3/16/2017  7:01 AM   Arterial Line Interventions Zeroed and calibrated;Leveled;Connections checked and tightened;Flushed per protocol 3/16/2017  7:01 AM   Color/Movement/Sensation Capillary refill less than 3 sec 3/16/2017  3:05 AM   Dressing Type Transparent 3/16/2017  7:01 AM   Dressing Status Biopatch in place;Clean;Dry;Intact 3/16/2017  7:01 AM   Dressing Intervention New dressing 3/15/2017 11:03 PM   Dressing Change Due 03/22/17 3/16/2017  7:01 AM   Number of days:1            Closed/Suction Drain 03/15/17 1321 Right Scalp Accordion 10 Fr. (Active)   Site Description Unable to view 3/16/2017  7:01 AM   Dressing Type [place] 3/16/2017  7:01 AM   Dressing Status Old drainage;Intact 3/16/2017  7:01 AM   Drainage Serosanguineous 3/16/2017  7:01 AM   Status Other (Comment) 3/16/2017  7:01 AM   Output (mL) 5 mL 3/16/2017  3:00 AM   Number of days:0            Urethral Catheter 03/15/17 0823 Straight-tip;Non-latex 16 Fr. (Active)   Site Assessment Clean;Intact 3/16/2017  3:05 AM   Collection  "Container Urimeter 3/16/2017  3:05 AM   Securement Method secured to top of thigh w/ adhesive device 3/16/2017  3:05 AM   Catheter Care Performed yes 3/16/2017  3:05 AM   Reason for Continuing Urinary Catheterization Post operative 3/16/2017  3:05 AM   CAUTI Prevention Bundle StatLock in place w 1" slack;Intact seal between catheter & drainage tubing;Drainage bag off the floor;Green sheeting clip in use;No dependent loops or kinks;Drainage bag not overfilled (<2/3 full);Drainage bag below bladder 3/16/2017  3:05 AM   Output (mL) 45 mL 3/16/2017 11:00 AM   Number of days:1       Wound/Incision:  Dressed w/o drainage    Laboratory:  CBC:   Recent Labs  Lab 03/16/17  0303   WBC 19.17*   RBC 5.45   HGB 16.3   HCT 48.3      MCV 89   MCH 29.9   MCHC 33.7     CMP:   Recent Labs  Lab 03/16/17  0303 03/16/17  0836   *  --    CALCIUM 9.2  --    ALBUMIN 3.7  --    PROT 6.8  --    *  146* 141   K 3.5  --    CO2 23  --      --    BUN 9  --    CREATININE 0.9  --    ALKPHOS 85  --    ALT 39  --    AST 21  --    BILITOT 0.4  --      Coagulation:   Recent Labs  Lab 03/15/17  0700 03/16/17  0303   INR 0.9 1.0   APTT 26.8  --      Cardiac markers: No results for input(s): CKMB, CPKMB, TROPONINT, TROPONINI, MYOGLOBIN in the last 168 hours.    Recent Labs  Lab 03/15/17  1954   COLORU Colorless*   SPECGRAV 1.005   PHUR 5.0   PROTEINUA Negative   NITRITE Negative   LEUKOCYTESUR Negative   UROBILINOGEN Negative         Diagnostic Results:  CT head: reviewed, stable postop changes.    ASSESSMENT/PLAN:     Assessment: 41 M s/p crani for resection of sellar mass.  -q 1 hr neuro checks  -Continue drain, abx while in place  -SBP less than 150  -Keppra  -Continue hydrocortisone taper.  -DI: q 4 hr Na and U/A.  Drink to thirst.  Rec endo consult  -Strict is and os  -ADAT, replete lytes PRN  -Ok to start SQH tomorrow  "

## 2017-03-16 NOTE — PROGRESS NOTES
Patient urine o/p > 300mL x 2 hrs (350 mL & 550 mL). Notified Dr. Pineda. MD to place orders. Will continue to monitor.

## 2017-03-16 NOTE — PROGRESS NOTES
Desmopressin not in pyxis or patient specific bin (Message sent @ 8660). Notified Rajeev with pharmacy. Sent new request. Will continue to monitor.

## 2017-03-16 NOTE — PROGRESS NOTES
ICU Progress Note  Neurocritical Care    Admit Date: 3/15/2017  LOS: 1    CC: Brain tumor    SUBJECTIVE:     Interval History/Significant Events: Mr. Roldan Moulton is a 42yo male with past history significant for pituitary adenoma s/p transphenoidal resection on 06/24/2015. 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 He is otherwise healthy, denies HTN, DMII. He does get testosterone injections. Of note, patient had DI post surgically last time which was treated with DDAVP.     POD 1 s/p R frontotemporal craniotomy for excision of sellar tumor.  Pt with DI. Treated with DDAVP 10 mcg intra nasal x 1 overnight with resolution.  Endocrinology consulted.      Review of Symptoms: + headache   Constitutional: Denies fevers or chills.  Pulmonary: Denies shortness of breath or cough.  Cardiology: Denies chest pain or palpitations.  GI: Denies abdominal pain or constipation.  Neurologic: Denies new weakness, headaches, or paresthesias.     Medications:  Continuous Infusions:   Scheduled Meds:   ceFAZolin (ANCEF) IVPB  1 g Intravenous Q8H    [START ON 3/17/2017] heparin (porcine)  5,000 Units Subcutaneous Q8H    [START ON 3/18/2017] hydrocortisone  10 mg Oral QHS    hydrocortisone  50 mg Oral BID    Followed by    [START ON 3/18/2017] hydrocortisone  20 mg Oral Before breakfast    levetiracetam  500 mg Oral BID    senna-docusate 8.6-50 mg  1 tablet Oral Daily    sodium chloride 0.9%  3 mL Intravenous Q8H     PRN Meds:.ondansetron, oxycodone-acetaminophen, sodium chloride 0.9%    OBJECTIVE:     I & O (24h):  Intake/Output Summary (Last 24 hours) at 03/16/17 1341  Last data filed at 03/16/17 1300   Gross per 24 hour   Intake           6101.7  ml   Output           6560.5 ml   Net           -458.8 ml       Physical Exam:  Last Vitals:  Vitals:    03/16/17 1300   BP: 119/64   Pulse: 60   Resp: (!) 24   Temp:      Vital Signs (24h Range):   Temp:  [98 °F (36.7 °C)-99.2 °F (37.3 °C)] 99 °F (37.2 °C)  Pulse:  [] 60  Resp:  [14-30] 24  SpO2:  [91 %-100 %] 93 %  BP: ()/(48-85) 119/64  Arterial Line BP: ()/(58-97) 122/62  GA: Alert, comfortable, no acute distress.   HEENT: No scleral icterus or JVD.   Pulmonary: Clear to auscultation A/P/L. No wheezing, crackles, or rhonchi.  Cardiac: RRR S1 & S2 w/o rubs/murmurs/gallops.   Abdominal: Bowel sounds present x 4. No appreciable hepatosplenomegaly.  Skin: No jaundice, rashes, or visible lesions.  Neuro:  --GCS: E4 V5 M6  --Mental Status:  Alert and oriented x 4.  --CN II-XII grossly intact.   --Pupils 4mm, PERRL.  --L homonymous hemianopsia   --Corneal reflex, gag, cough intact.  --LUE strength: 5/5  --RUE strength: 5/5  --LLE strength: 5/5  --RLE strength: 5/5    --Arterial Line:        Arterial Line 03/15/17 Left Radial (Active)   Site Assessment Clean;Dry;Intact;No redness;No swelling 3/16/2017 11:00 AM   Line Status Pulsatile blood flow 3/16/2017 11:00 AM   Art Line Waveform Appropriate 3/16/2017 11:00 AM   Arterial Line Interventions Zeroed and calibrated;Leveled;Connections checked and tightened;Flushed per protocol 3/16/2017 11:00 AM   Color/Movement/Sensation Capillary refill less than 3 sec 3/16/2017  3:05 AM   Dressing Type Transparent 3/16/2017 11:00 AM   Dressing Status Biopatch in place;Clean;Dry;Intact 3/16/2017 11:00 AM   Dressing Intervention New dressing 3/15/2017 11:03 PM   Dressing Change Due 03/22/17 3/16/2017 11:00 AM       --Surgical Drains/Turcios:        Closed/Suction Drain 03/15/17 1321 Right Scalp Accordion 10 Fr. (Active)   Site Description Unable to view 3/16/2017 11:00 AM   Dressing Type [place] 3/16/2017 11:00 AM   Dressing Status Old drainage;Intact 3/16/2017  "11:00 AM   Drainage Serosanguineous 3/16/2017 11:00 AM   Status Other (Comment) 3/16/2017 11:00 AM   Output (mL) 5 mL 3/16/2017  3:00 AM            Urethral Catheter 03/15/17 0823 Straight-tip;Non-latex 16 Fr. (Active)   Site Assessment Clean;Intact 3/16/2017  3:05 AM   Collection Container Urimeter 3/16/2017  3:05 AM   Securement Method secured to top of thigh w/ adhesive device 3/16/2017  3:05 AM   Catheter Care Performed yes 3/16/2017  3:05 AM   Reason for Continuing Urinary Catheterization Post operative 3/16/2017  3:05 AM   CAUTI Prevention Bundle StatLock in place w 1" slack;Intact seal between catheter & drainage tubing;Drainage bag off the floor;Green sheeting clip in use;No dependent loops or kinks;Drainage bag not overfilled (<2/3 full);Drainage bag below bladder 3/16/2017  3:05 AM   Output (mL) 43 mL 3/16/2017  1:00 PM       Nutrition: regular diet      Labs:  ABG: No results for input(s): PH, PO2, PCO2, HCO3, POCSATURATED, BE in the last 24 hours.    BMP:  Recent Labs  Lab 03/16/17  0303  03/16/17  1238   *  146*  < > 140   K 3.5  --   --      --   --    CO2 23  --   --    BUN 9  --   --    CREATININE 0.9  --   --    *  --   --    MG 1.9  --   --    PHOS 3.8  --   --    < > = values in this interval not displayed.    LFT: Lab Results   Component Value Date    AST 21 03/16/2017    ALT 39 03/16/2017    ALKPHOS 85 03/16/2017    BILITOT 0.4 03/16/2017    ALBUMIN 3.7 03/16/2017    PROT 6.8 03/16/2017       CBC:   Lab Results   Component Value Date    WBC 19.17 (H) 03/16/2017    HGB 16.3 03/16/2017    HCT 48.3 03/16/2017    MCV 89 03/16/2017     03/16/2017       Microbiology x 7d:   Microbiology Results (last 7 days)     ** No results found for the last 168 hours. **          Imaging:  I have personally reviewed.     ASSESSMENT/PLAN:     Patient Active Problem List   Diagnosis    Brain tumor    Pituitary adenoma    Primary central diabetes insipidus    Secondary male hypogonadism "       Neuro:   POD 1 s/p R frontotemporal craniotomy excision of sellar tumor  - pathology from previous resection showing pituitary adenoma  - NSGY following  - continue hydrocort taper  - continue Keppra 500mg BID  - post op CTH with post surgical changes and pneumocephalus      Pulmonary:   - adequate sats on RA      Cardiac:   - SBP goal 100-160   - cardene infusion d/c      Renal:    - BUN, creat stable  - UOP elevated with DI   - strict I/O     ID:   - leukocytosis likely post surgical and 2/2 steroids  - afebrile  - ancef while drain in place      Hem/Onc:   - H/H WNL  - platelets 299  - INR 1.0     Endocrine:    DI  - DDAVP 10 mcg intra nasal x1 overnight  - endocrinology consulted, appreciate recs       Fluids/Electrolytes/Nutrition/GI:   - regular diet   - IVF d/c   - Na q6h for DI    Level III    Shannon Morris PA-C  Neuro Critical Care  [id number]

## 2017-03-16 NOTE — PROGRESS NOTES
Patient's urine o/p remains elevated (750 mL). Desmopressin nasal spray administered @ 2335. Na+ 149. Notified Dr. Mueller. No new orders. Will continue to monitor.

## 2017-03-16 NOTE — PLAN OF CARE
03/16/17 1600   Discharge Assessment   Assessment Type Discharge Planning Assessment   Confirmed/corrected address and phone number on facesheet? Yes   Assessment information obtained from? Caregiver   Expected Length of Stay (days) 3   Communicated expected length of stay with patient/caregiver yes   Prior to hospitilization cognitive status: Alert/Oriented   Prior to hospitalization functional status: Independent   Current cognitive status: Alert/Oriented   Current Functional Status: Independent   Arrived From admitted as an inpatient   Lives With spouse   Able to Return to Prior Arrangements yes   Is patient able to care for self after discharge? Yes   How many people do you have in your home that can help with your care after discharge? 1   Who are your caregiver(s) and their phone number(s)? Debbie (Wife)- [phone]   Patient's perception of discharge disposition home or selfcare   Readmission Within The Last 30 Days no previous admission in last 30 days   Patient currently being followed by outpatient case management? No   Patient currently receives home health services? No   Does the patient currently use HME? No   Patient currently receives private duty nursing? N/A   Patient currently receives any other outside agency services? No   Equipment Currently Used at Home none   Do you have any problems affording any of your prescribed medications? No   Is the patient taking medications as prescribed? yes   Do you have any financial concerns preventing you from receiving the healthcare you need? No   Does the patient have transportation to healthcare appointments? Yes   Transportation Available family or friend will provide   On Dialysis? No   Does the patient receive services at the Coumadin Clinic? No   Are there any open cases? No   Discharge Plan A Home with family   Discharge Plan B Home with family   Patient/Family In Agreement With Plan yes             PCP:  Melania Gil,  MD      Pharmacy:    [facility] - [place], [place] - [address]  [address]  [place] [address]  Phone: [phone] Fax: [phone]        Emergency Contacts:  Extended Emergency Contact Information  Primary Emergency Contact: Contact,No   [facility]  Home Phone: [phone]  Relation: Other  Secondary Emergency Contact: Debbie Moulton  Address: [id number] [place] [address], [place] [address] [facility]  Home Phone: [phone]  Mobile Phone: [phone]  Relation: Spouse      Insurance:  Payor:  / Plan: ACTIVE DUTY  PERSONNEL / Product Type: Government /          Cyndi Garcia RN, CCRN-K, CCM  Neuro-Critical Care   X [phone]

## 2017-03-17 LAB
ALBUMIN SERPL BCP-MCNC: 3.1 G/DL
ALP SERPL-CCNC: 69 U/L
ALT SERPL W/O P-5'-P-CCNC: 28 U/L
ANION GAP SERPL CALC-SCNC: 8 MMOL/L
ANION GAP SERPL CALC-SCNC: 9 MMOL/L
AST SERPL-CCNC: 20 U/L
BASOPHILS # BLD AUTO: 0.01 K/UL
BASOPHILS NFR BLD: 0.1 %
BILIRUB SERPL-MCNC: 0.8 MG/DL
BILIRUB UR QL STRIP: NEGATIVE
BUN SERPL-MCNC: 10 MG/DL
BUN SERPL-MCNC: 8 MG/DL
BUN SERPL-MCNC: 9 MG/DL
CALCIUM SERPL-MCNC: 8.6 MG/DL
CALCIUM SERPL-MCNC: 8.7 MG/DL
CALCIUM SERPL-MCNC: 8.8 MG/DL
CHLORIDE SERPL-SCNC: 105 MMOL/L
CHLORIDE SERPL-SCNC: 106 MMOL/L
CHLORIDE SERPL-SCNC: 108 MMOL/L
CLARITY UR REFRACT.AUTO: CLEAR
CO2 SERPL-SCNC: 25 MMOL/L
CO2 SERPL-SCNC: 26 MMOL/L
COLOR UR AUTO: ABNORMAL
CREAT SERPL-MCNC: 0.8 MG/DL
DIFFERENTIAL METHOD: ABNORMAL
EOSINOPHIL # BLD AUTO: 0 K/UL
EOSINOPHIL NFR BLD: 0.1 %
ERYTHROCYTE [DISTWIDTH] IN BLOOD BY AUTOMATED COUNT: 13.7 %
EST. GFR  (AFRICAN AMERICAN): >60 ML/MIN/1.73 M^2
EST. GFR  (NON AFRICAN AMERICAN): >60 ML/MIN/1.73 M^2
GLUCOSE SERPL-MCNC: 108 MG/DL
GLUCOSE SERPL-MCNC: 120 MG/DL
GLUCOSE SERPL-MCNC: 127 MG/DL
GLUCOSE UR QL STRIP: NEGATIVE
HCT VFR BLD AUTO: 42.4 %
HGB BLD-MCNC: 14.5 G/DL
HGB UR QL STRIP: ABNORMAL
KETONES UR QL STRIP: NEGATIVE
LEUKOCYTE ESTERASE UR QL STRIP: NEGATIVE
LYMPHOCYTES # BLD AUTO: 1.8 K/UL
LYMPHOCYTES NFR BLD: 11.8 %
MAGNESIUM SERPL-MCNC: 2.2 MG/DL
MCH RBC QN AUTO: 29.7 PG
MCHC RBC AUTO-ENTMCNC: 34.2 %
MCV RBC AUTO: 87 FL
MICROSCOPIC COMMENT: NORMAL
MONOCYTES # BLD AUTO: 1.2 K/UL
MONOCYTES NFR BLD: 8.1 %
NEUTROPHILS # BLD AUTO: 11.9 K/UL
NEUTROPHILS NFR BLD: 79.6 %
NITRITE UR QL STRIP: NEGATIVE
PH UR STRIP: >8 [PH] (ref 5–8)
PHOSPHATE SERPL-MCNC: 1.7 MG/DL
PLATELET # BLD AUTO: 233 K/UL
PMV BLD AUTO: 11.1 FL
POTASSIUM SERPL-SCNC: 3.8 MMOL/L
POTASSIUM SERPL-SCNC: 3.9 MMOL/L
POTASSIUM SERPL-SCNC: 4.2 MMOL/L
PROT SERPL-MCNC: 6.1 G/DL
PROT UR QL STRIP: NEGATIVE
RBC # BLD AUTO: 4.89 M/UL
RBC #/AREA URNS AUTO: 0 /HPF (ref 0–4)
SODIUM SERPL-SCNC: 138 MMOL/L
SODIUM SERPL-SCNC: 140 MMOL/L
SODIUM SERPL-SCNC: 142 MMOL/L
SODIUM SERPL-SCNC: 142 MMOL/L
SP GR UR STRIP: 1 (ref 1–1.03)
URN SPEC COLLECT METH UR: ABNORMAL
UROBILINOGEN UR STRIP-ACNC: NEGATIVE EU/DL
WBC # BLD AUTO: 14.97 K/UL
WBC #/AREA URNS AUTO: 0 /HPF (ref 0–5)

## 2017-03-17 PROCEDURE — 25000003 PHARM REV CODE 250: Performed by: PHYSICIAN ASSISTANT

## 2017-03-17 PROCEDURE — 99232 SBSQ HOSP IP/OBS MODERATE 35: CPT | Mod: ,,, | Performed by: INTERNAL MEDICINE

## 2017-03-17 PROCEDURE — 94761 N-INVAS EAR/PLS OXIMETRY MLT: CPT

## 2017-03-17 PROCEDURE — 25000003 PHARM REV CODE 250: Performed by: STUDENT IN AN ORGANIZED HEALTH CARE EDUCATION/TRAINING PROGRAM

## 2017-03-17 PROCEDURE — 63600175 PHARM REV CODE 636 W HCPCS: Performed by: STUDENT IN AN ORGANIZED HEALTH CARE EDUCATION/TRAINING PROGRAM

## 2017-03-17 PROCEDURE — 84100 ASSAY OF PHOSPHORUS: CPT

## 2017-03-17 PROCEDURE — 85025 COMPLETE CBC W/AUTO DIFF WBC: CPT

## 2017-03-17 PROCEDURE — 99233 SBSQ HOSP IP/OBS HIGH 50: CPT | Mod: ,,, | Performed by: PSYCHIATRY & NEUROLOGY

## 2017-03-17 PROCEDURE — 83735 ASSAY OF MAGNESIUM: CPT

## 2017-03-17 PROCEDURE — 20000000 HC ICU ROOM

## 2017-03-17 PROCEDURE — 80053 COMPREHEN METABOLIC PANEL: CPT

## 2017-03-17 PROCEDURE — 81001 URINALYSIS AUTO W/SCOPE: CPT

## 2017-03-17 PROCEDURE — 80048 BASIC METABOLIC PNL TOTAL CA: CPT

## 2017-03-17 RX ORDER — ACETAMINOPHEN 325 MG/1
650 TABLET ORAL EVERY 6 HOURS PRN
Status: DISCONTINUED | OUTPATIENT
Start: 2017-03-17 | End: 2017-03-24 | Stop reason: HOSPADM

## 2017-03-17 RX ORDER — OXYCODONE AND ACETAMINOPHEN 10; 325 MG/1; MG/1
1 TABLET ORAL EVERY 6 HOURS PRN
Status: DISCONTINUED | OUTPATIENT
Start: 2017-03-17 | End: 2017-03-18

## 2017-03-17 RX ORDER — DESMOPRESSIN ACETATE 4 UG/ML
2 INJECTION, SOLUTION INTRAVENOUS; SUBCUTANEOUS ONCE
Status: COMPLETED | OUTPATIENT
Start: 2017-03-17 | End: 2017-03-17

## 2017-03-17 RX ADMIN — DESMOPRESSIN ACETATE 2 MCG: 4 SOLUTION INTRAVENOUS at 05:03

## 2017-03-17 RX ADMIN — LEVETIRACETAM 500 MG: 500 TABLET, FILM COATED ORAL at 08:03

## 2017-03-17 RX ADMIN — HEPARIN SODIUM 5000 UNITS: 5000 INJECTION, SOLUTION INTRAVENOUS; SUBCUTANEOUS at 06:03

## 2017-03-17 RX ADMIN — ACETAMINOPHEN 650 MG: 500 TABLET ORAL at 10:03

## 2017-03-17 RX ADMIN — CEFAZOLIN SODIUM 1 G: 1 SOLUTION INTRAVENOUS at 12:03

## 2017-03-17 RX ADMIN — Medication 3 ML: at 06:03

## 2017-03-17 RX ADMIN — OXYCODONE HYDROCHLORIDE AND ACETAMINOPHEN 1 TABLET: 5; 325 TABLET ORAL at 08:03

## 2017-03-17 RX ADMIN — STANDARDIZED SENNA CONCENTRATE AND DOCUSATE SODIUM 1 TABLET: 8.6; 5 TABLET, FILM COATED ORAL at 09:03

## 2017-03-17 RX ADMIN — HEPARIN SODIUM 5000 UNITS: 5000 INJECTION, SOLUTION INTRAVENOUS; SUBCUTANEOUS at 10:03

## 2017-03-17 RX ADMIN — HYDROCORTISONE 50 MG: 20 TABLET ORAL at 08:03

## 2017-03-17 RX ADMIN — OXYCODONE HYDROCHLORIDE AND ACETAMINOPHEN 1 TABLET: 10; 325 TABLET ORAL at 01:03

## 2017-03-17 RX ADMIN — OXYCODONE HYDROCHLORIDE AND ACETAMINOPHEN 1 TABLET: 10; 325 TABLET ORAL at 07:03

## 2017-03-17 RX ADMIN — CEFAZOLIN SODIUM 1 G: 1 SOLUTION INTRAVENOUS at 04:03

## 2017-03-17 RX ADMIN — CEFAZOLIN SODIUM 1 G: 1 SOLUTION INTRAVENOUS at 08:03

## 2017-03-17 RX ADMIN — LEVETIRACETAM 500 MG: 500 TABLET, FILM COATED ORAL at 09:03

## 2017-03-17 RX ADMIN — Medication 3 ML: at 10:03

## 2017-03-17 RX ADMIN — HEPARIN SODIUM 5000 UNITS: 5000 INJECTION, SOLUTION INTRAVENOUS; SUBCUTANEOUS at 01:03

## 2017-03-17 RX ADMIN — CEFAZOLIN SODIUM 1 G: 1 SOLUTION INTRAVENOUS at 11:03

## 2017-03-17 RX ADMIN — Medication 3 ML: at 02:03

## 2017-03-17 RX ADMIN — OXYCODONE HYDROCHLORIDE AND ACETAMINOPHEN 1 TABLET: 5; 325 TABLET ORAL at 02:03

## 2017-03-17 NOTE — NURSING
Pt urinated 350-400 ml for two consecutive hours. Neuro CC notified. Dr. Lang ordered a serum sodium, and a urinalysis, and 2 mcg of desmopressin. Will follow through with orders,and continue to monitor.

## 2017-03-17 NOTE — PROGRESS NOTES
"[facility]  Endocrinology  Progress Note    Admit Date: 3/15/2017     Consult requested by: WALLY Kidd MD    Reason for Consult: DI    HPI:  Patient is a 40 y/o male s/p craniotomy for recurrent pituitary adenoma.  Underwent initial Transphenoidal Resection in 6/24/2015 and subsequent revision.  This is his third procedure.  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  He developed post-operative DI after his second procedure and discharged on DDAVP.  He has had first two procedures completed at [facility] in [place].  His wife is unsure if he is still taking this medication.  He also developed secondary hypogonadism and take Testosterone Cypionate 100 mg q week.  Post operatively, patient has DI and has received one dose of DDAVP 10 mcg intrranasally          Interval HPI:   Overnight events: UOP decreased overnight.  Did not receive DDAVP.  Results for ITA NEWTON (MRN [id number]) as of 3/17/2017 10:34   Ref. Range 3/16/2017 12:38   Prolactin Latest Ref Range: 3.5 - 19.4 ng/mL 0.8 (L)     Results for ITA NEWTON (MRN [id number]) as of 3/17/2017 10:34   Ref. Range 3/16/2017 12:38   TSH Latest Ref Range: 0.400 - 4.000 uIU/mL 0.972   Free T4 Latest Ref Range: 0.71 - 1.51 ng/dL 1.02     /78  Pulse 77  Temp 98.9 °F (37.2 °C)  Resp (!) 25  Ht 5' 10" (1.778 m)  Wt 89.2 kg (196 lb 10.4 oz)  SpO2 95%  BMI 28.22 kg/m2    Labs Reviewed and Include      Recent Labs  Lab 03/17/17  0155   *  127*  127*   CALCIUM 8.6*  8.6*  8.6*   ALBUMIN 3.1*   PROT 6.1     138  138   K 3.8  3.8  3.8   CO2 25  25  25     105  105   BUN 10  10  10   CREATININE 0.8  0.8  0.8   ALKPHOS 69   ALT 28   AST 20   BILITOT 0.8     Lab Results   Component Value Date    WBC 14.97 (H) 03/17/2017    HGB 14.5 03/17/2017    HCT 42.4 03/17/2017    MCV 87 03/17/2017     03/17/2017       Recent Labs  Lab 03/16/17  1238   TSH " 0.972   FREET4 1.02     No results found for: HGBA1C    Nutritional status:   Body mass index is 28.22 kg/(m^2).  Lab Results   Component Value Date    ALBUMIN 3.1 (L) 03/17/2017    ALBUMIN 3.7 03/16/2017    ALBUMIN 3.8 03/15/2017     No results found for: PREALBUMIN    Estimated Creatinine Clearance: 139.4 mL/min (based on Cr of 0.8).    Accu-Checks  No results for input(s): POCTGLUCOSE in the last 72 hours.    Current Medications and/or Treatments Impacting Glycemic Control  Immunotherapy:  Immunosuppressants     None        Steroids:   Hormones     Start     Stop Route Frequency Ordered    03/18/17 0645  hydrocortisone tablet 20 mg      03/19 0644 Oral Before breakfast 03/15/17 1437 03/18/17 2100  hydrocortisone tablet 10 mg      -- Oral Nightly 03/15/17 1437        Pressors:    Autonomic Drugs     None        Hyperglycemia/Diabetes Medications: Antihyperglycemics     None          ASSESSMENT and PLAN    Pituitary adenoma  S/p craniotomy   -On hydrocortisone taper per Neurosurgery  -Prolactin low as expected,  Free T4 normal   ACTH, Cortisol will be unrevealing as patient is on steroids.    Patient on Testosterone supplementation so FSH, LH will be unrevealing.       Primary central diabetes insipidus  Recommend patient have strict i/o  -DDAVP 10 mcg intransal prn if patient with two of the three parameters in place (Serum NA >145, Urine Spec gravity <1.005, and Two consecutive urine o/p greater than 250 cc/hr)  -no further DI noted      Secondary male hypogonadism  Hold testosterone while inpatient   -Can resume o/p        Rebeka Horowitz MD  Endocrinology  [facility]

## 2017-03-17 NOTE — SUBJECTIVE & OBJECTIVE
"Interval HPI:   Overnight events: UOP decreased overnight.  Did not receive DDAVP.  Results for ITA NEWTON (MRN [id number]) as of 3/17/2017 10:34   Ref. Range 3/16/2017 12:38   Prolactin Latest Ref Range: 3.5 - 19.4 ng/mL 0.8 (L)     Results for ITA NEWTON (MRN [id number]) as of 3/17/2017 10:34   Ref. Range 3/16/2017 12:38   TSH Latest Ref Range: 0.400 - 4.000 uIU/mL 0.972   Free T4 Latest Ref Range: 0.71 - 1.51 ng/dL 1.02     /78  Pulse 77  Temp 98.9 °F (37.2 °C)  Resp (!) 25  Ht 5' 10" (1.778 m)  Wt 89.2 kg (196 lb 10.4 oz)  SpO2 95%  BMI 28.22 kg/m2    Labs Reviewed and Include      Recent Labs  Lab 03/17/17  0155   *  127*  127*   CALCIUM 8.6*  8.6*  8.6*   ALBUMIN 3.1*   PROT 6.1     138  138   K 3.8  3.8  3.8   CO2 25  25  25     105  105   BUN 10  10  10   CREATININE 0.8  0.8  0.8   ALKPHOS 69   ALT 28   AST 20   BILITOT 0.8     Lab Results   Component Value Date    WBC 14.97 (H) 03/17/2017    HGB 14.5 03/17/2017    HCT 42.4 03/17/2017    MCV 87 03/17/2017     03/17/2017       Recent Labs  Lab 03/16/17  1238   TSH 0.972   FREET4 1.02     No results found for: HGBA1C    Nutritional status:   Body mass index is 28.22 kg/(m^2).  Lab Results   Component Value Date    ALBUMIN 3.1 (L) 03/17/2017    ALBUMIN 3.7 03/16/2017    ALBUMIN 3.8 03/15/2017     No results found for: PREALBUMIN    Estimated Creatinine Clearance: 139.4 mL/min (based on Cr of 0.8).    Accu-Checks  No results for input(s): POCTGLUCOSE in the last 72 hours.    Current Medications and/or Treatments Impacting Glycemic Control  Immunotherapy:  Immunosuppressants     None        Steroids:   Hormones     Start     Stop Route Frequency Ordered    03/18/17 0645  hydrocortisone tablet 20 mg      03/19 0644 Oral Before breakfast 03/15/17 1437    03/18/17 2100  hydrocortisone tablet 10 mg      -- Oral Nightly 03/15/17 1437        Pressors:    Autonomic Drugs     None        Hyperglycemia/Diabetes " Medications: Antihyperglycemics     None

## 2017-03-17 NOTE — ASSESSMENT & PLAN NOTE
Recommend patient have strict i/o  -DDAVP 10 mcg intransal prn if patient with two of the three parameters in place (Serum NA >145, Urine Spec gravity <1.005, and Two consecutive urine o/p greater than 250 cc/hr)  -no further DI noted

## 2017-03-17 NOTE — PLAN OF CARE
Problem: Patient Care Overview  Goal: Plan of Care Review  Outcome: Ongoing (interventions implemented as appropriate)  POC reviewed with pt at 0400. Pt verbalized understanding. Questions and concerns addressed. No acute events overnight. Pt progressing toward goals. Will continue to monitor. See flowsheets for full assessment and VS info      History reviewed. No pertinent past medical history.

## 2017-03-17 NOTE — PROGRESS NOTES
ICU Progress Note  Neurocritical Care    Admit Date: 3/15/2017  LOS: 2    CC: Brain tumor    SUBJECTIVE:     Interval History/Significant Events:   No acute events overnight     Medications:  Continuous Infusions:   Scheduled Meds:   ceFAZolin (ANCEF) IVPB  1 g Intravenous Q8H    heparin (porcine)  5,000 Units Subcutaneous Q8H    [START ON 3/18/2017] hydrocortisone  10 mg Oral QHS    [START ON 3/18/2017] hydrocortisone  20 mg Oral Before breakfast    levetiracetam  500 mg Oral BID    senna-docusate 8.6-50 mg  1 tablet Oral Daily    sodium chloride 0.9%  3 mL Intravenous Q8H     PRN Meds:.acetaminophen, ondansetron, oxycodone-acetaminophen, sodium chloride 0.9%    OBJECTIVE:     I & O (24h):    Intake/Output Summary (Last 24 hours) at 03/17/17 1240  Last data filed at 03/17/17 1205   Gross per 24 hour   Intake              830 ml   Output             2769 ml   Net            -1939 ml       Physical Exam:  Last Vitals:  Vitals:    03/17/17 1005   BP: 125/78   Pulse: 77   Resp: (!) 25   Temp:      Vital Signs (24h Range):   Temp:  [97.8 °F (36.6 °C)-99.1 °F (37.3 °C)] 98.9 °F (37.2 °C)  Pulse:  [54-77] 77  Resp:  [0-47] 25  SpO2:  [93 %-95 %] 95 %  BP: (102-131)/(53-78) 125/78  Arterial Line BP: ()/(57-75) 122/75  GA: Alert, comfortable, no acute distress.   HEENT: No scleral icterus or JVD.   Pulmonary: Clear to auscultation A/P/L. No wheezing, crackles, or rhonchi.  Cardiac: RRR S1 & S2 w/o rubs/murmurs/gallops.   Abdominal: Bowel sounds present x 4. No appreciable hepatosplenomegaly.  Skin: No jaundice, rashes, or visible lesions.  Neuro:  --GCS: E4 V5 M6  --Mental Status:  Alert and oriented x 4.  --CN II-XII grossly intact.   --Pupils 4mm, PERRL.  --L homonymous hemianopsia   --Corneal reflex, gag, cough intact.  --LUE strength: 5/5  --RUE strength: 5/5  --LLE strength: 5/5  --RLE strength: 5/5    --Arterial Line:        Arterial Line 03/15/17 Left Radial (Active)   Site Assessment Clean;Dry;Intact;No  "redness;No swelling 3/16/2017 11:00 AM   Line Status Pulsatile blood flow 3/16/2017 11:00 AM   Art Line Waveform Appropriate 3/16/2017 11:00 AM   Arterial Line Interventions Zeroed and calibrated;Leveled;Connections checked and tightened;Flushed per protocol 3/16/2017 11:00 AM   Color/Movement/Sensation Capillary refill less than 3 sec 3/16/2017  3:05 AM   Dressing Type Transparent 3/16/2017 11:00 AM   Dressing Status Biopatch in place;Clean;Dry;Intact 3/16/2017 11:00 AM   Dressing Intervention New dressing 3/15/2017 11:03 PM   Dressing Change Due 03/22/17 3/16/2017 11:00 AM       --Surgical Drains/[place]:        Closed/Suction Drain 03/15/17 1321 Right Scalp Accordion 10 Fr. (Active)   Site Description Unable to view 3/16/2017 11:00 AM   Dressing Type [place] 3/16/2017 11:00 AM   Dressing Status Old drainage;Intact 3/16/2017 11:00 AM   Drainage Serosanguineous 3/16/2017 11:00 AM   Status Other (Comment) 3/16/2017 11:00 AM   Output (mL) 5 mL 3/16/2017  3:00 AM            Urethral Catheter 03/15/17 0823 Straight-tip;Non-latex 16 Fr. (Active)   Site Assessment Clean;Intact 3/16/2017  3:05 AM   Collection Container Urimeter 3/16/2017  3:05 AM   Securement Method secured to top of thigh w/ adhesive device 3/16/2017  3:05 AM   Catheter Care Performed yes 3/16/2017  3:05 AM   Reason for Continuing Urinary Catheterization Post operative 3/16/2017  3:05 AM   CAUTI Prevention Bundle StatLock in place w 1" slack;Intact seal between catheter & drainage tubing;Drainage bag off the floor;Green sheeting clip in use;No dependent loops or kinks;Drainage bag not overfilled (<2/3 full);Drainage bag below bladder 3/16/2017  3:05 AM   Output (mL) 43 mL 3/16/2017  1:00 PM       Nutrition: regular diet      Labs:  ABG: No results for input(s): PH, PO2, PCO2, HCO3, POCSATURATED, BE in the last 24 hours.    BMP:    Recent Labs  Lab 03/17/17  0155 03/17/17  1155     138  138 140   K 3.8  3.8  3.8 4.2     105  105 106 "   CO2 25  25  25 25   BUN 10  10  10 9   CREATININE 0.8  0.8  0.8 0.8   *  127*  127* 120*   MG 2.2  --    PHOS 1.7*  --        LFT:   Lab Results   Component Value Date    AST 20 03/17/2017    ALT 28 03/17/2017    ALKPHOS 69 03/17/2017    BILITOT 0.8 03/17/2017    ALBUMIN 3.1 (L) 03/17/2017    PROT 6.1 03/17/2017       CBC:   Lab Results   Component Value Date    WBC 14.97 (H) 03/17/2017    HGB 14.5 03/17/2017    HCT 42.4 03/17/2017    MCV 87 03/17/2017     03/17/2017       Microbiology x 7d:   Microbiology Results (last 7 days)     ** No results found for the last 168 hours. **            ASSESSMENT/PLAN:     1. Pituitary adenoma s/p resection  2. DI  3. Hypopituitarism     A/P:  - Neurological exam unchanged.  - Pain control, will increase pain meds   - Continue Q1H neuro checks.  - Continue to monitor urine output and Na level.  - Appreciate endocrine recs  - Encourage PO intake  - Start SQH         Care level 3.

## 2017-03-17 NOTE — PROGRESS NOTES
Progress Note  Neurosurgery    Admit Date: 3/15/2017  Post-operative Day: 2 Days Post-Op  Hospital Day: 3    SUBJECTIVE:     Roldan Moulton is a 41 y.o. male s/p right frontotemporal crani for resection of sellar mass.  Follow-up For:  Procedure(s) (LRB):  CRANIOTOMY WITH STEALTH, RIGHT FRONTOTEMPORAL CRANIOTOMY for tumor (Right)    NAEON.    Scheduled Meds:   ceFAZolin (ANCEF) IVPB  1 g Intravenous Q8H    heparin (porcine)  5,000 Units Subcutaneous Q8H    [START ON 3/18/2017] hydrocortisone  10 mg Oral QHS    [START ON 3/18/2017] hydrocortisone  20 mg Oral Before breakfast    levetiracetam  500 mg Oral BID    senna-docusate 8.6-50 mg  1 tablet Oral Daily    sodium chloride 0.9%  3 mL Intravenous Q8H     Continuous Infusions:   PRN Meds:acetaminophen, ondansetron, oxycodone-acetaminophen, sodium chloride 0.9%    Review of patient's allergies indicates:  No Known Allergies    OBJECTIVE:     Vital Signs (Most Recent)  Temp: 98.9 °F (37.2 °C) (03/17/17 0705)  Pulse: 77 (03/17/17 1005)  Resp: (!) 25 (03/17/17 1005)  BP: 125/78 (03/17/17 1005)  SpO2: 95 % (03/17/17 1005)    Vital Signs Range (Last 24H):  Temp:  [97.8 °F (36.6 °C)-99.1 °F (37.3 °C)]   Pulse:  [54-77]   Resp:  [0-47]   BP: (102-131)/(53-78)   SpO2:  [93 %-95 %]   Arterial Line BP: ()/(57-75)     I & O (Last 24H):    Intake/Output Summary (Last 24 hours) at 03/17/17 1407  Last data filed at 03/17/17 1205   Gross per 24 hour   Intake              820 ml   Output             2691 ml   Net            -1871 ml     Physical Exam:  AAOX3, speech fluent  PERRL, EOMI, face symm, tongue midline  Left homonymous hemianopsia  CARLIN symm, FC x4  SILT  No drift    Lines/Drains:       Peripheral IV - Single Lumen 03/15/17 0725 Left Hand (Active)   Site Assessment Clean;Dry;Intact 3/16/2017  7:01 AM   Line Status Infusing 3/16/2017  7:01 AM   Dressing Status Clean;Dry;Intact 3/16/2017  7:01 AM   Dressing Intervention New dressing 3/15/2017  4:00 PM   Dressing  Change Due 03/19/17 3/16/2017  3:05 AM   Site Change Due 03/19/17 3/16/2017  7:01 AM   Reason Not Rotated Not due 3/16/2017  3:05 AM   Number of days:1            Peripheral IV - Single Lumen 03/15/17 0826 Right Hand (Active)   Site Assessment Clean;Dry;Intact 3/16/2017  7:01 AM   Line Status Infusing 3/16/2017  7:01 AM   Dressing Status Clean;Dry;Intact 3/16/2017  7:01 AM   Dressing Intervention New dressing 3/15/2017  4:00 PM   Dressing Change Due 03/19/17 3/16/2017  3:05 AM   Site Change Due 03/19/17 3/16/2017  7:01 AM   Reason Not Rotated Not due 3/16/2017  3:05 AM   Number of days:1            Arterial Line 03/15/17 Left Radial (Active)   Site Assessment Clean;Dry;Intact;No redness;No swelling 3/16/2017  7:01 AM   Line Status Pulsatile blood flow 3/16/2017  7:01 AM   Art Line Waveform Appropriate 3/16/2017  7:01 AM   Arterial Line Interventions Zeroed and calibrated;Leveled;Connections checked and tightened;Flushed per protocol 3/16/2017  7:01 AM   Color/Movement/Sensation Capillary refill less than 3 sec 3/16/2017  3:05 AM   Dressing Type Transparent 3/16/2017  7:01 AM   Dressing Status Biopatch in place;Clean;Dry;Intact 3/16/2017  7:01 AM   Dressing Intervention New dressing 3/15/2017 11:03 PM   Dressing Change Due 03/22/17 3/16/2017  7:01 AM   Number of days:1            Closed/Suction Drain 03/15/17 1321 Right Scalp Accordion 10 Fr. (Active)   Site Description Unable to view 3/16/2017  7:01 AM   Dressing Type [place] 3/16/2017  7:01 AM   Dressing Status Old drainage;Intact 3/16/2017  7:01 AM   Drainage Serosanguineous 3/16/2017  7:01 AM   Status Other (Comment) 3/16/2017  7:01 AM   Output (mL) 5 mL 3/16/2017  3:00 AM   Number of days:0            Urethral Catheter 03/15/17 0823 Straight-tip;Non-latex 16 Fr. (Active)   Site Assessment Clean;Intact 3/16/2017  3:05 AM   Collection Container Urimeter 3/16/2017  3:05 AM   Securement Method secured to top of thigh w/ adhesive device 3/16/2017  3:05 AM  "  Catheter Care Performed yes 3/16/2017  3:05 AM   Reason for Continuing Urinary Catheterization Post operative 3/16/2017  3:05 AM   CAUTI Prevention Bundle StatLock in place w 1" slack;Intact seal between catheter & drainage tubing;Drainage bag off the floor;Green sheeting clip in use;No dependent loops or kinks;Drainage bag not overfilled (<2/3 full);Drainage bag below bladder 3/16/2017  3:05 AM   Output (mL) 45 mL 3/16/2017 11:00 AM   Number of days:1       Wound/Incision:  C/d/i    Laboratory:  CBC:     Recent Labs  Lab 03/17/17  0155   WBC 14.97*   RBC 4.89   HGB 14.5   HCT 42.4      MCV 87   MCH 29.7   MCHC 34.2     CMP:     Recent Labs  Lab 03/17/17  0155 03/17/17  1155   *  127*  127* 120*   CALCIUM 8.6*  8.6*  8.6* 8.8   ALBUMIN 3.1*  --    PROT 6.1  --      138  138 140   K 3.8  3.8  3.8 4.2   CO2 25  25  25 25     105  105 106   BUN 10  10  10 9   CREATININE 0.8  0.8  0.8 0.8   ALKPHOS 69  --    ALT 28  --    AST 20  --    BILITOT 0.8  --      Coagulation:     Recent Labs  Lab 03/15/17  0700 03/16/17  0303   INR 0.9 1.0   APTT 26.8  --      Cardiac markers: No results for input(s): CKMB, CPKMB, TROPONINT, TROPONINI, MYOGLOBIN in the last 168 hours.    Recent Labs  Lab 03/15/17  1954   COLORU Colorless*   SPECGRAV 1.005   PHUR 5.0   PROTEINUA Negative   NITRITE Negative   LEUKOCYTESUR Negative   UROBILINOGEN Negative         Diagnostic Results:  CT head: reviewed, stable postop changes.    ASSESSMENT/PLAN:     Assessment: 41 M s/p crani for resection of sellar mass.  -q 1 hr neuro checks  -Continue drain, abx while in place  -SBP less than 150  -Keppra  -Continue hydrocortisone taper.  -DI: resolved, continue q6 BMP.  Consider ddAVP if uo greater than 250 cc/hr, spec grav less than 1.005 and Na above 145.  -ADAT, replete lytes PRN  -SQH.  "

## 2017-03-17 NOTE — ASSESSMENT & PLAN NOTE
S/p craniotomy   -On hydrocortisone taper per Neurosurgery  -Prolactin low as expected,  Free T4 normal   ACTH, Cortisol will be unrevealing as patient is on steroids.    Patient on Testosterone supplementation so FSH, LH will be unrevealing.

## 2017-03-18 LAB
ALBUMIN SERPL BCP-MCNC: 2.9 G/DL
ALP SERPL-CCNC: 67 U/L
ALT SERPL W/O P-5'-P-CCNC: 28 U/L
ANION GAP SERPL CALC-SCNC: 10 MMOL/L
ANION GAP SERPL CALC-SCNC: 10 MMOL/L
ANION GAP SERPL CALC-SCNC: 11 MMOL/L
ANION GAP SERPL CALC-SCNC: 11 MMOL/L
ANION GAP SERPL CALC-SCNC: 12 MMOL/L
ANION GAP SERPL CALC-SCNC: 9 MMOL/L
AST SERPL-CCNC: 18 U/L
BASOPHILS # BLD AUTO: 0.02 K/UL
BASOPHILS NFR BLD: 0.2 %
BILIRUB SERPL-MCNC: 0.6 MG/DL
BUN SERPL-MCNC: 10 MG/DL
BUN SERPL-MCNC: 10 MG/DL
BUN SERPL-MCNC: 11 MG/DL
BUN SERPL-MCNC: 11 MG/DL
BUN SERPL-MCNC: 12 MG/DL
BUN SERPL-MCNC: 9 MG/DL
CALCIUM SERPL-MCNC: 8.6 MG/DL
CALCIUM SERPL-MCNC: 8.6 MG/DL
CALCIUM SERPL-MCNC: 8.7 MG/DL
CALCIUM SERPL-MCNC: 8.8 MG/DL
CHLORIDE SERPL-SCNC: 103 MMOL/L
CHLORIDE SERPL-SCNC: 104 MMOL/L
CHLORIDE SERPL-SCNC: 105 MMOL/L
CHLORIDE SERPL-SCNC: 106 MMOL/L
CO2 SERPL-SCNC: 21 MMOL/L
CO2 SERPL-SCNC: 23 MMOL/L
CO2 SERPL-SCNC: 24 MMOL/L
CO2 SERPL-SCNC: 24 MMOL/L
CO2 SERPL-SCNC: 25 MMOL/L
CO2 SERPL-SCNC: 25 MMOL/L
CREAT SERPL-MCNC: 0.8 MG/DL
DIFFERENTIAL METHOD: NORMAL
EOSINOPHIL # BLD AUTO: 0.1 K/UL
EOSINOPHIL NFR BLD: 0.6 %
ERYTHROCYTE [DISTWIDTH] IN BLOOD BY AUTOMATED COUNT: 13.3 %
EST. GFR  (AFRICAN AMERICAN): >60 ML/MIN/1.73 M^2
EST. GFR  (NON AFRICAN AMERICAN): >60 ML/MIN/1.73 M^2
GLUCOSE SERPL-MCNC: 101 MG/DL
GLUCOSE SERPL-MCNC: 134 MG/DL
GLUCOSE SERPL-MCNC: 91 MG/DL
GLUCOSE SERPL-MCNC: 92 MG/DL
GLUCOSE SERPL-MCNC: 94 MG/DL
GLUCOSE SERPL-MCNC: 99 MG/DL
HCT VFR BLD AUTO: 43.1 %
HGB BLD-MCNC: 14.6 G/DL
LYMPHOCYTES # BLD AUTO: 3.3 K/UL
LYMPHOCYTES NFR BLD: 31.3 %
MAGNESIUM SERPL-MCNC: 1.9 MG/DL
MCH RBC QN AUTO: 30 PG
MCHC RBC AUTO-ENTMCNC: 33.9 %
MCV RBC AUTO: 89 FL
MONOCYTES # BLD AUTO: 0.8 K/UL
MONOCYTES NFR BLD: 7.7 %
NEUTROPHILS # BLD AUTO: 6.4 K/UL
NEUTROPHILS NFR BLD: 59.9 %
PHOSPHATE SERPL-MCNC: 2.2 MG/DL
PLATELET # BLD AUTO: 221 K/UL
PMV BLD AUTO: 10.8 FL
POTASSIUM SERPL-SCNC: 3.5 MMOL/L
POTASSIUM SERPL-SCNC: 3.5 MMOL/L
POTASSIUM SERPL-SCNC: 3.6 MMOL/L
POTASSIUM SERPL-SCNC: 3.6 MMOL/L
POTASSIUM SERPL-SCNC: 3.7 MMOL/L
POTASSIUM SERPL-SCNC: 3.8 MMOL/L
PROT SERPL-MCNC: 6.3 G/DL
RBC # BLD AUTO: 4.86 M/UL
SODIUM SERPL-SCNC: 138 MMOL/L
SODIUM SERPL-SCNC: 139 MMOL/L
SODIUM SERPL-SCNC: 140 MMOL/L
SODIUM SERPL-SCNC: 140 MMOL/L
WBC # BLD AUTO: 10.6 K/UL

## 2017-03-18 PROCEDURE — 80048 BASIC METABOLIC PNL TOTAL CA: CPT | Mod: 91

## 2017-03-18 PROCEDURE — 25000003 PHARM REV CODE 250: Performed by: STUDENT IN AN ORGANIZED HEALTH CARE EDUCATION/TRAINING PROGRAM

## 2017-03-18 PROCEDURE — 97535 SELF CARE MNGMENT TRAINING: CPT

## 2017-03-18 PROCEDURE — 63600175 PHARM REV CODE 636 W HCPCS: Performed by: PHYSICIAN ASSISTANT

## 2017-03-18 PROCEDURE — 80053 COMPREHEN METABOLIC PANEL: CPT

## 2017-03-18 PROCEDURE — 20000000 HC ICU ROOM

## 2017-03-18 PROCEDURE — 84100 ASSAY OF PHOSPHORUS: CPT

## 2017-03-18 PROCEDURE — 25000003 PHARM REV CODE 250: Performed by: PHYSICIAN ASSISTANT

## 2017-03-18 PROCEDURE — 63600175 PHARM REV CODE 636 W HCPCS: Performed by: STUDENT IN AN ORGANIZED HEALTH CARE EDUCATION/TRAINING PROGRAM

## 2017-03-18 PROCEDURE — 94761 N-INVAS EAR/PLS OXIMETRY MLT: CPT

## 2017-03-18 PROCEDURE — 85025 COMPLETE CBC W/AUTO DIFF WBC: CPT

## 2017-03-18 PROCEDURE — 97530 THERAPEUTIC ACTIVITIES: CPT

## 2017-03-18 PROCEDURE — 83735 ASSAY OF MAGNESIUM: CPT

## 2017-03-18 PROCEDURE — 99233 SBSQ HOSP IP/OBS HIGH 50: CPT | Mod: ,,, | Performed by: PSYCHIATRY & NEUROLOGY

## 2017-03-18 RX ORDER — OXYCODONE HYDROCHLORIDE 5 MG/1
15 TABLET ORAL EVERY 6 HOURS PRN
Status: DISCONTINUED | OUTPATIENT
Start: 2017-03-19 | End: 2017-03-24 | Stop reason: HOSPADM

## 2017-03-18 RX ORDER — DESMOPRESSIN ACETATE 4 UG/ML
2 INJECTION, SOLUTION INTRAVENOUS; SUBCUTANEOUS ONCE
Status: COMPLETED | OUTPATIENT
Start: 2017-03-18 | End: 2017-03-18

## 2017-03-18 RX ORDER — OXYCODONE HYDROCHLORIDE 5 MG/1
5 TABLET ORAL EVERY 6 HOURS PRN
Status: DISCONTINUED | OUTPATIENT
Start: 2017-03-19 | End: 2017-03-24 | Stop reason: HOSPADM

## 2017-03-18 RX ORDER — OXYCODONE HYDROCHLORIDE 5 MG/1
5 TABLET ORAL ONCE
Status: COMPLETED | OUTPATIENT
Start: 2017-03-18 | End: 2017-03-18

## 2017-03-18 RX ADMIN — ACETAMINOPHEN 650 MG: 500 TABLET ORAL at 11:03

## 2017-03-18 RX ADMIN — LEVETIRACETAM 500 MG: 500 TABLET, FILM COATED ORAL at 08:03

## 2017-03-18 RX ADMIN — OXYCODONE HYDROCHLORIDE AND ACETAMINOPHEN 1 TABLET: 10; 325 TABLET ORAL at 08:03

## 2017-03-18 RX ADMIN — Medication 3 ML: at 09:03

## 2017-03-18 RX ADMIN — OXYCODONE HYDROCHLORIDE AND ACETAMINOPHEN 1 TABLET: 10; 325 TABLET ORAL at 02:03

## 2017-03-18 RX ADMIN — Medication 3 ML: at 02:03

## 2017-03-18 RX ADMIN — HYDROCORTISONE 10 MG: 10 TABLET ORAL at 08:03

## 2017-03-18 RX ADMIN — HEPARIN SODIUM 5000 UNITS: 5000 INJECTION, SOLUTION INTRAVENOUS; SUBCUTANEOUS at 09:03

## 2017-03-18 RX ADMIN — HEPARIN SODIUM 5000 UNITS: 5000 INJECTION, SOLUTION INTRAVENOUS; SUBCUTANEOUS at 02:03

## 2017-03-18 RX ADMIN — OXYCODONE HYDROCHLORIDE 5 MG: 5 TABLET ORAL at 09:03

## 2017-03-18 RX ADMIN — HYDROCORTISONE 20 MG: 20 TABLET ORAL at 05:03

## 2017-03-18 RX ADMIN — DESMOPRESSIN ACETATE 2 MCG: 4 SOLUTION INTRAVENOUS at 03:03

## 2017-03-18 RX ADMIN — CEFAZOLIN SODIUM 1 G: 1 SOLUTION INTRAVENOUS at 08:03

## 2017-03-18 RX ADMIN — HEPARIN SODIUM 5000 UNITS: 5000 INJECTION, SOLUTION INTRAVENOUS; SUBCUTANEOUS at 05:03

## 2017-03-18 RX ADMIN — Medication 3 ML: at 05:03

## 2017-03-18 RX ADMIN — STANDARDIZED SENNA CONCENTRATE AND DOCUSATE SODIUM 1 TABLET: 8.6; 5 TABLET, FILM COATED ORAL at 08:03

## 2017-03-18 RX ADMIN — ACETAMINOPHEN 650 MG: 500 TABLET ORAL at 04:03

## 2017-03-18 NOTE — PT/OT/SLP PROGRESS
"Occupational Therapy  Treatment    Roldan Moulton   MRN: [id number]   Admitting Diagnosis: Brain tumor    OT Date of Treatment: 17   OT Start Time: 1120  OT Stop Time: 1143  OT Total Time (min): 23 min    Billable Minutes:  Self Care/Home Management 13 and Therapeutic Activity 10    General Precautions: Standard, fall  Orthopedic Precautions: N/A  Braces: N/A    Do you have any cultural, spiritual, Holiness conflicts, given your current situation?: none reported    Subjective:  Communicated with RN (Bren) prior to session.  Pt reports, "Ya my head is really hurting me."    Pain Ratin/10  Location - Side: Bilateral  Location - Orientation: generalized  Location: head  Pain Addressed: Reposition, Distraction, Nurse notified, Pre-medicate for activity  Pain Rating Post-Intervention: 8/10    Objective:  Patient found with: peripheral IV, pulse ox (continuous), telemetry, blood pressure cuff, arterial line, de guzman catheter     Functional Mobility:  Bed Mobility:  Scooting/Bridging: Stand by Assistance  Supine to Sit: Stand by Assistance    Transfers:   Sit <> Stand Assistance: Stand By Assistance  Sit <> Stand Assistive Device: No Assistive Device  Bed <> Chair Technique: Stand Pivot  Bed <> Chair Transfer Assistance: Contact Guard Assistance  Bed <> Chair Assistive Device: No Assistive Device    Functional Ambulation: Pt took ~5-6 steps within stand-turn transfer from EOB to bedside chair using no AD with CGA. Cues for safety with line management.     Activities of Daily Living:     UE Dressing Level of Assistance: Minimum assistance (incidental (A) to don gown while in standing at EOB)  UE adaptive equipment: None     Grooming Position: Standing  Grooming Level of Assistance: Stand by assistance (at raised tray table for simulated counter-top to brush teeth and wash face)    Balance:   Static Sit: FAIR+: Able to take MINIMAL challenges from all directions  Dynamic Sit: FAIR+: Maintains balance through MINIMAL " excursions of active trunk motion  Static Stand: FAIR+: Takes MINIMAL challenges from all directions  Dynamic stand: FAIR: Needs CONTACT GUARD during gait    Therapeutic Activities and Exercises:  Pt and family member educated on importance of OOB mobility. Pt receptive to being UIC this PM. Discussed OT POC.     Pt with flat affect throughout session, however could have been pain related.     Noted increased edema in (L) UE which impeded his  strength and coordination with his (L) UE. Pt w/ multiple lines in (L) hand which most likely contributed to the impairments stated.     AM-PAC 6 CLICK ADL   How much help from another person does this patient currently need?   1 = Unable, Total/Dependent Assistance  2 = A lot, Maximum/Moderate Assistance  3 = A little, Minimum/Contact Guard/Supervision  4 = None, Modified [place]/Independent    Putting on and taking off regular lower body clothing? : 3  Bathing (including washing, rinsing, drying)?: 3  Toileting, which includes using toilet, bedpan, or urinal? : 3  Putting on and taking off regular upper body clothing?: 3  Taking care of personal grooming such as brushing teeth?: 3  Eating meals?: 4  Total Score: 19     AM-PAC Raw Score CMS G-Code Modifier Level of Impairment Assistance   6 % Total / Unable   7 - 9 CM 80 - 100% Maximal Assist   10 - 14 CL 60 - 80% Moderate Assist   15 - 19 CK 40 - 60% Moderate Assist   20 - 22 CJ 20 - 40% Minimal Assist   23 CI 1-20% SBA / CGA   24 CH 0% Independent/ Mod I     Patient left up in chair with all lines intact, call button in reach, RN notified and family member present    ASSESSMENT:  Roldan Moulton is a 41 y.o. male with a medical diagnosis of Brain tumor. Pt is making steady progress towards his OT goals. Noted improvements in pt's bed mobility, standing balance and functional transfers this date. Pt continues to be limited by sig pain and his (L) UE demo'd impaired  and coordination 2/2 edema and multiple  line placements. Feel that pt remains appropriate for acute OT services at this time to further address stated deficits and maximize return to PLOF.      Rehab identified problem list/impairments: Rehab identified problem list/impairments: impaired endurance, impaired self care skills, impaired functional mobilty, gait instability, impaired balance, decreased safety awareness, pain, impaired coordination, edema    Rehab potential is good.    Activity tolerance: Good    Discharge recommendations: Discharge Facility/Level Of Care Needs: home     Barriers to discharge: Barriers to Discharge: None    Equipment recommendations:  (TBD pending further gait assessment)     GOALS:   Occupational Therapy Goals        Problem: Occupational Therapy Goal    Goal Priority Disciplines Outcome Interventions   Occupational Therapy Goal     OT, PT/OT Ongoing (interventions implemented as appropriate)    Description:  Goals to be met by: 3/23/2017    Patient will increase functional independence with ADLs by performing:    UE Dressing with Modified [place].  LE Dressing with Modified [place].  Grooming while standing at sink with Modified [place].  Toileting from toilet with Modified [place] for hygiene and clothing management.   Supine to sit with Modified [place].  Toilet transfer to toilet with Modified [place].                Plan:  Patient to be seen 3 x/week to address the above listed problems via self-care/home management, therapeutic activities, therapeutic exercises  Plan of Care expires: 04/15/17  Plan of Care reviewed with: patient, family         Belinda Russell OTR/L  03/18/2017

## 2017-03-18 NOTE — PROGRESS NOTES
Progress Note  Neurosurgery    Admit Date: 3/15/2017  Post-operative Day: 3 Days Post-Op  Hospital Day: 4    SUBJECTIVE:     Roldan Moulton is a 41 y.o. male s/p right frontotemporal crani for resection of sellar mass.  Follow-up For:  Procedure(s) (LRB):  CRANIOTOMY WITH STEALTH, RIGHT FRONTOTEMPORAL CRANIOTOMY for tumor (Right)    NAEON, no ddAVP overnight.    Scheduled Meds:   ceFAZolin (ANCEF) IVPB  1 g Intravenous Q8H    heparin (porcine)  5,000 Units Subcutaneous Q8H    hydrocortisone  10 mg Oral QHS    levetiracetam  500 mg Oral BID    senna-docusate 8.6-50 mg  1 tablet Oral Daily    sodium chloride 0.9%  3 mL Intravenous Q8H     Continuous Infusions:   PRN Meds:acetaminophen, ondansetron, oxycodone-acetaminophen, sodium chloride 0.9%    Review of patient's allergies indicates:  No Known Allergies    OBJECTIVE:     Vital Signs (Most Recent)  Temp: 98.9 °F (37.2 °C) (03/18/17 0705)  Pulse: 71 (03/18/17 0806)  Resp: 17 (03/18/17 0806)  BP: 111/67 (03/18/17 0806)  SpO2: 95 % (03/18/17 0806)    Vital Signs Range (Last 24H):  Temp:  [97.9 °F (36.6 °C)-99.2 °F (37.3 °C)]   Pulse:  [51-89]   Resp:  [0-26]   BP: (102-124)/(53-87)   SpO2:  [93 %-97 %]   Arterial Line BP: ()/(60-83)     I & O (Last 24H):    Intake/Output Summary (Last 24 hours) at 03/18/17 1039  Last data filed at 03/18/17 0805   Gross per 24 hour   Intake              425 ml   Output             2910 ml   Net            -2485 ml     Physical Exam:  AAOX3, speech fluent  PERRL, EOMI, face symm, tongue midline  Left homonymous hemianopsia  CARLIN symm, FC x4  SILT  No drift    Lines/Drains:       Peripheral IV - Single Lumen 03/15/17 0725 Left Hand (Active)   Site Assessment Clean;Dry;Intact 3/16/2017  7:01 AM   Line Status Infusing 3/16/2017  7:01 AM   Dressing Status Clean;Dry;Intact 3/16/2017  7:01 AM   Dressing Intervention New dressing 3/15/2017  4:00 PM   Dressing Change Due 03/19/17 3/16/2017  3:05 AM   Site Change Due 03/19/17 3/16/2017   7:01 AM   Reason Not Rotated Not due 3/16/2017  3:05 AM   Number of days:1            Peripheral IV - Single Lumen 03/15/17 0826 Right Hand (Active)   Site Assessment Clean;Dry;Intact 3/16/2017  7:01 AM   Line Status Infusing 3/16/2017  7:01 AM   Dressing Status Clean;Dry;Intact 3/16/2017  7:01 AM   Dressing Intervention New dressing 3/15/2017  4:00 PM   Dressing Change Due 03/19/17 3/16/2017  3:05 AM   Site Change Due 03/19/17 3/16/2017  7:01 AM   Reason Not Rotated Not due 3/16/2017  3:05 AM   Number of days:1            Arterial Line 03/15/17 Left Radial (Active)   Site Assessment Clean;Dry;Intact;No redness;No swelling 3/16/2017  7:01 AM   Line Status Pulsatile blood flow 3/16/2017  7:01 AM   Art Line Waveform Appropriate 3/16/2017  7:01 AM   Arterial Line Interventions Zeroed and calibrated;Leveled;Connections checked and tightened;Flushed per protocol 3/16/2017  7:01 AM   Color/Movement/Sensation Capillary refill less than 3 sec 3/16/2017  3:05 AM   Dressing Type Transparent 3/16/2017  7:01 AM   Dressing Status Biopatch in place;Clean;Dry;Intact 3/16/2017  7:01 AM   Dressing Intervention New dressing 3/15/2017 11:03 PM   Dressing Change Due 03/22/17 3/16/2017  7:01 AM   Number of days:1            Closed/Suction Drain 03/15/17 1321 Right Scalp Accordion 10 Fr. (Active)   Site Description Unable to view 3/16/2017  7:01 AM   Dressing Type [place] 3/16/2017  7:01 AM   Dressing Status Old drainage;Intact 3/16/2017  7:01 AM   Drainage Serosanguineous 3/16/2017  7:01 AM   Status Other (Comment) 3/16/2017  7:01 AM   Output (mL) 5 mL 3/16/2017  3:00 AM   Number of days:0            Urethral Catheter 03/15/17 0823 Straight-tip;Non-latex 16 Fr. (Active)   Site Assessment Clean;Intact 3/16/2017  3:05 AM   Collection Container Urimeter 3/16/2017  3:05 AM   Securement Method secured to top of thigh w/ adhesive device 3/16/2017  3:05 AM   Catheter Care Performed yes 3/16/2017  3:05 AM   Reason for Continuing Urinary  "Catheterization Post operative 3/16/2017  3:05 AM   CAUTI Prevention Bundle StatLock in place w 1" slack;Intact seal between catheter & drainage tubing;Drainage bag off the floor;Green sheeting clip in use;No dependent loops or kinks;Drainage bag not overfilled (<2/3 full);Drainage bag below bladder 3/16/2017  3:05 AM   Output (mL) 45 mL 3/16/2017 11:00 AM   Number of days:1       Wound/Incision:  C/d/i    Laboratory:  CBC:     Recent Labs  Lab 03/18/17  0240   WBC 10.60   RBC 4.86   HGB 14.6   HCT 43.1      MCV 89   MCH 30.0   MCHC 33.9     CMP:     Recent Labs  Lab 03/18/17  0240 03/18/17  0545   GLU 94 92   CALCIUM 8.7 8.7   ALBUMIN 2.9*  --    PROT 6.3  --     140   K 3.6 3.7   CO2 21* 25    106   BUN 10 10   CREATININE 0.8 0.8   ALKPHOS 67  --    ALT 28  --    AST 18  --    BILITOT 0.6  --      Coagulation:     Recent Labs  Lab 03/15/17  0700 03/16/17  0303   INR 0.9 1.0   APTT 26.8  --      Cardiac markers: No results for input(s): CKMB, CPKMB, TROPONINT, TROPONINI, MYOGLOBIN in the last 168 hours.    Recent Labs  Lab 03/17/17  1614   COLORU Straw   SPECGRAV 1.005   PHUR >8.0*   PROTEINUA Negative   NITRITE Negative   LEUKOCYTESUR Negative   UROBILINOGEN Negative         Diagnostic Results:  CT head: reviewed, stable postop changes.    ASSESSMENT/PLAN:     Assessment: 41 M s/p crani for resection of sellar mass.  -q 1 hr neuro checks  -Drain out, dc abx.  -SBP less than 150  -Keppra  -Continue hydrocortisone taper.  -DI: resolved, continue q6 BMP.  Consider ddAVP if uo greater than 250 cc/hr, spec grav less than 1.005 and Na above 145.  -ADAT, replete lytes PRN  -SQH.  -TTF under NSGY tomorrow.  "

## 2017-03-18 NOTE — PROGRESS NOTES
ICU Progress Note  Neurocritical Care    Admit Date: 3/15/2017  LOS: 3    CC: Brain tumor    SUBJECTIVE:     Interval History/Significant Events:   No acute events overnight     Medications:  Continuous Infusions:   Scheduled Meds:   heparin (porcine)  5,000 Units Subcutaneous Q8H    hydrocortisone  10 mg Oral QHS    levetiracetam  500 mg Oral BID    senna-docusate 8.6-50 mg  1 tablet Oral Daily    sodium chloride 0.9%  3 mL Intravenous Q8H     PRN Meds:.acetaminophen, ondansetron, oxycodone-acetaminophen, sodium chloride 0.9%    OBJECTIVE:     I & O (24h):    Intake/Output Summary (Last 24 hours) at 03/18/17 1255  Last data filed at 03/18/17 1205   Gross per 24 hour   Intake              425 ml   Output             2735 ml   Net            -2310 ml       Physical Exam:  Last Vitals:  Vitals:    03/18/17 1205   BP:    Pulse: (!) 56   Resp: 19   Temp:      Vital Signs (24h Range):   Temp:  [98.2 °F (36.8 °C)-99.2 °F (37.3 °C)] 99.2 °F (37.3 °C)  Pulse:  [49-89] 56  Resp:  [0-39] 19  SpO2:  [93 %-97 %] 94 %  BP: (102-124)/(55-87) 109/60  Arterial Line BP: ()/(58-83) 95/59  GA: Alert, comfortable, no acute distress.   HEENT: No scleral icterus or JVD.   Pulmonary: Clear to auscultation A/P/L. No wheezing, crackles, or rhonchi.  Cardiac: RRR S1 & S2 w/o rubs/murmurs/gallops.   Abdominal: Bowel sounds present x 4. No appreciable hepatosplenomegaly.  Skin: No jaundice, rashes, or visible lesions.  Neuro:  --GCS: E4 V5 M6  --Mental Status:  Alert and oriented x 4.  --CN II-XII grossly intact.   --Pupils 4mm, PERRL.  --L homonymous hemianopsia   --Corneal reflex, gag, cough intact.  --LUE strength: 5/5  --RUE strength: 5/5  --LLE strength: 5/5  --RLE strength: 5/5    --Arterial Line:        Arterial Line 03/15/17 Left Radial (Active)   Site Assessment Clean;Dry;Intact;No redness;No swelling 3/16/2017 11:00 AM   Line Status Pulsatile blood flow 3/16/2017 11:00 AM   Art Line Waveform Appropriate 3/16/2017 11:00 AM  "  Arterial Line Interventions Zeroed and calibrated;Leveled;Connections checked and tightened;Flushed per protocol 3/16/2017 11:00 AM   Color/Movement/Sensation Capillary refill less than 3 sec 3/16/2017  3:05 AM   Dressing Type Transparent 3/16/2017 11:00 AM   Dressing Status Biopatch in place;Clean;Dry;Intact 3/16/2017 11:00 AM   Dressing Intervention New dressing 3/15/2017 11:03 PM   Dressing Change Due 03/22/17 3/16/2017 11:00 AM       --Surgical Drains/Turcios:        Closed/Suction Drain 03/15/17 1321 Right Scalp Accordion 10 Fr. (Active)   Site Description Unable to view 3/16/2017 11:00 AM   Dressing Type [place] 3/16/2017 11:00 AM   Dressing Status Old drainage;Intact 3/16/2017 11:00 AM   Drainage Serosanguineous 3/16/2017 11:00 AM   Status Other (Comment) 3/16/2017 11:00 AM   Output (mL) 5 mL 3/16/2017  3:00 AM            Urethral Catheter 03/15/17 0823 Straight-tip;Non-latex 16 Fr. (Active)   Site Assessment Clean;Intact 3/16/2017  3:05 AM   Collection Container Urimeter 3/16/2017  3:05 AM   Securement Method secured to top of thigh w/ adhesive device 3/16/2017  3:05 AM   Catheter Care Performed yes 3/16/2017  3:05 AM   Reason for Continuing Urinary Catheterization Post operative 3/16/2017  3:05 AM   CAUTI Prevention Bundle StatLock in place w 1" slack;Intact seal between catheter & drainage tubing;Drainage bag off the floor;Green sheeting clip in use;No dependent loops or kinks;Drainage bag not overfilled (<2/3 full);Drainage bag below bladder 3/16/2017  3:05 AM   Output (mL) 43 mL 3/16/2017  1:00 PM       Nutrition: regular diet      Labs:  ABG: No results for input(s): PH, PO2, PCO2, HCO3, POCSATURATED, BE in the last 24 hours.    BMP:    Recent Labs  Lab 03/18/17  0240  03/18/17  1109     < > 139   K 3.6  < > 3.5     < > 105   CO2 21*  < > 24   BUN 10  < > 11   CREATININE 0.8  < > 0.8   GLU 94  < > 134*   MG 1.9  --   --    PHOS 2.2*  --   --    < > = values in this interval not " displayed.    LFT:   Lab Results   Component Value Date    AST 18 03/18/2017    ALT 28 03/18/2017    ALKPHOS 67 03/18/2017    BILITOT 0.6 03/18/2017    ALBUMIN 2.9 (L) 03/18/2017    PROT 6.3 03/18/2017       CBC:   Lab Results   Component Value Date    WBC 10.60 03/18/2017    HGB 14.6 03/18/2017    HCT 43.1 03/18/2017    MCV 89 03/18/2017     03/18/2017       Microbiology x 7d:   Microbiology Results (last 7 days)     ** No results found for the last 168 hours. **            ASSESSMENT/PLAN:     1. Pituitary adenoma s/p resection  2. DI  3. Hypopituitarism     A/P:  - Neurological exam unchanged.  - Drain is out, dc abx  - Pain is under better control after increasing pain meds  - Decrease neuro checks frequency   - Continue to monitor urine output and Na level.  - Appreciate endocrine recs  - Encourage PO intake  - H         Care level 3.

## 2017-03-18 NOTE — PLAN OF CARE
Problem: Patient Care Overview  Goal: Plan of Care Review  Outcome: Ongoing (interventions implemented as appropriate)  POC reviewed with pt and family at 1400. Pt verbalized understanding. Questions and concerns addressed. Pt progressing toward goals. See flowsheets for full assessment and VS info.      Pt urine increased to 350 for two consecutive hours. Neuro CC notified. 2 mcg of desmopressin was ordered. Order followed through. Will continue to monitor.

## 2017-03-18 NOTE — PLAN OF CARE
Problem: Patient Care Overview  Goal: Plan of Care Review  Outcome: Ongoing (interventions implemented as appropriate)  POC reviewed with pt at 0400. Pt verbalized understanding. Questions and concerns addressed. No acute events overnight. Pt progressing toward goals. Will continue to monitor. See flowsheets for full assessment and VS info

## 2017-03-18 NOTE — PLAN OF CARE
Problem: Occupational Therapy Goal  Goal: Occupational Therapy Goal  Goals to be met by: 3/23/2017    Patient will increase functional independence with ADLs by performing:    UE Dressing with Modified [place].  LE Dressing with Modified [place].  Grooming while standing at sink with Modified [place].  Toileting from toilet with Modified [place] for hygiene and clothing management.   Supine to sit with Modified [place].  Toilet transfer to toilet with Modified [place].   Outcome: Ongoing (interventions implemented as appropriate)  Pt is making steady progress towards his OT goals. Goals remain appropriate at this time.      Belinda Russell, OTR/L  3/18/2017

## 2017-03-19 PROBLEM — E23.2 PRIMARY CENTRAL DIABETES INSIPIDUS: Status: RESOLVED | Noted: 2017-03-16 | Resolved: 2017-03-19

## 2017-03-19 LAB
ALBUMIN SERPL BCP-MCNC: 3 G/DL
ALP SERPL-CCNC: 85 U/L
ALT SERPL W/O P-5'-P-CCNC: 102 U/L
ANION GAP SERPL CALC-SCNC: 12 MMOL/L
ANION GAP SERPL CALC-SCNC: 15 MMOL/L
ANION GAP SERPL CALC-SCNC: 9 MMOL/L
AST SERPL-CCNC: 38 U/L
BASOPHILS # BLD AUTO: 0.03 K/UL
BASOPHILS NFR BLD: 0.4 %
BILIRUB SERPL-MCNC: 0.7 MG/DL
BLD PROD TYP BPU: NORMAL
BLOOD UNIT EXPIRATION DATE: NORMAL
BLOOD UNIT TYPE CODE: 5100
BLOOD UNIT TYPE: NORMAL
BUN SERPL-MCNC: 12 MG/DL
BUN SERPL-MCNC: 12 MG/DL
BUN SERPL-MCNC: 8 MG/DL
BUN SERPL-MCNC: 8 MG/DL
BUN SERPL-MCNC: 9 MG/DL
CALCIUM SERPL-MCNC: 8.6 MG/DL
CALCIUM SERPL-MCNC: 8.8 MG/DL
CALCIUM SERPL-MCNC: 8.8 MG/DL
CALCIUM SERPL-MCNC: 8.9 MG/DL
CALCIUM SERPL-MCNC: 9.3 MG/DL
CHLORIDE SERPL-SCNC: 101 MMOL/L
CHLORIDE SERPL-SCNC: 103 MMOL/L
CO2 SERPL-SCNC: 22 MMOL/L
CO2 SERPL-SCNC: 22 MMOL/L
CO2 SERPL-SCNC: 23 MMOL/L
CO2 SERPL-SCNC: 23 MMOL/L
CO2 SERPL-SCNC: 28 MMOL/L
CODING SYSTEM: NORMAL
CREAT SERPL-MCNC: 0.8 MG/DL
CREAT SERPL-MCNC: 0.9 MG/DL
CREAT SERPL-MCNC: 1 MG/DL
DIFFERENTIAL METHOD: NORMAL
DISPENSE STATUS: NORMAL
EOSINOPHIL # BLD AUTO: 0.1 K/UL
EOSINOPHIL NFR BLD: 1.7 %
ERYTHROCYTE [DISTWIDTH] IN BLOOD BY AUTOMATED COUNT: 13 %
EST. GFR  (AFRICAN AMERICAN): >60 ML/MIN/1.73 M^2
EST. GFR  (NON AFRICAN AMERICAN): >60 ML/MIN/1.73 M^2
GLUCOSE SERPL-MCNC: 104 MG/DL
GLUCOSE SERPL-MCNC: 86 MG/DL
GLUCOSE SERPL-MCNC: 88 MG/DL
GLUCOSE SERPL-MCNC: 95 MG/DL
GLUCOSE SERPL-MCNC: 95 MG/DL
HCT VFR BLD AUTO: 43.3 %
HGB BLD-MCNC: 14.9 G/DL
LYMPHOCYTES # BLD AUTO: 2.4 K/UL
LYMPHOCYTES NFR BLD: 29.7 %
MAGNESIUM SERPL-MCNC: 1.8 MG/DL
MCH RBC QN AUTO: 29.8 PG
MCHC RBC AUTO-ENTMCNC: 34.4 %
MCV RBC AUTO: 87 FL
MONOCYTES # BLD AUTO: 0.6 K/UL
MONOCYTES NFR BLD: 7.7 %
NEUTROPHILS # BLD AUTO: 4.9 K/UL
NEUTROPHILS NFR BLD: 60.1 %
PHOSPHATE SERPL-MCNC: 2.9 MG/DL
PLATELET # BLD AUTO: 248 K/UL
PMV BLD AUTO: 10.7 FL
POTASSIUM SERPL-SCNC: 3.4 MMOL/L
POTASSIUM SERPL-SCNC: 3.6 MMOL/L
POTASSIUM SERPL-SCNC: 4 MMOL/L
PROT SERPL-MCNC: 6.3 G/DL
RBC # BLD AUTO: 5 M/UL
SODIUM SERPL-SCNC: 137 MMOL/L
SODIUM SERPL-SCNC: 138 MMOL/L
SODIUM SERPL-SCNC: 140 MMOL/L
TRANS ERYTHROCYTES VOL PATIENT: NORMAL ML
WBC # BLD AUTO: 8.09 K/UL

## 2017-03-19 PROCEDURE — 83735 ASSAY OF MAGNESIUM: CPT

## 2017-03-19 PROCEDURE — 80053 COMPREHEN METABOLIC PANEL: CPT

## 2017-03-19 PROCEDURE — 25000003 PHARM REV CODE 250: Performed by: STUDENT IN AN ORGANIZED HEALTH CARE EDUCATION/TRAINING PROGRAM

## 2017-03-19 PROCEDURE — 84100 ASSAY OF PHOSPHORUS: CPT

## 2017-03-19 PROCEDURE — 99232 SBSQ HOSP IP/OBS MODERATE 35: CPT | Mod: ,,, | Performed by: INTERNAL MEDICINE

## 2017-03-19 PROCEDURE — 85025 COMPLETE CBC W/AUTO DIFF WBC: CPT

## 2017-03-19 PROCEDURE — 25000003 PHARM REV CODE 250: Performed by: PHYSICIAN ASSISTANT

## 2017-03-19 PROCEDURE — 20000000 HC ICU ROOM

## 2017-03-19 PROCEDURE — 25000003 PHARM REV CODE 250: Performed by: PSYCHIATRY & NEUROLOGY

## 2017-03-19 PROCEDURE — 99233 SBSQ HOSP IP/OBS HIGH 50: CPT | Mod: ,,, | Performed by: PSYCHIATRY & NEUROLOGY

## 2017-03-19 PROCEDURE — 80048 BASIC METABOLIC PNL TOTAL CA: CPT | Mod: 91

## 2017-03-19 RX ORDER — POTASSIUM CHLORIDE 20 MEQ/1
40 TABLET, EXTENDED RELEASE ORAL DAILY
Status: DISCONTINUED | OUTPATIENT
Start: 2017-03-19 | End: 2017-03-23

## 2017-03-19 RX ORDER — BACITRACIN 500 [USP'U]/G
OINTMENT TOPICAL 2 TIMES DAILY
Status: DISCONTINUED | OUTPATIENT
Start: 2017-03-19 | End: 2017-03-24 | Stop reason: HOSPADM

## 2017-03-19 RX ADMIN — HEPARIN SODIUM 5000 UNITS: 5000 INJECTION, SOLUTION INTRAVENOUS; SUBCUTANEOUS at 01:03

## 2017-03-19 RX ADMIN — LEVETIRACETAM 500 MG: 500 TABLET, FILM COATED ORAL at 08:03

## 2017-03-19 RX ADMIN — HYDROCORTISONE 10 MG: 10 TABLET ORAL at 08:03

## 2017-03-19 RX ADMIN — OXYCODONE HYDROCHLORIDE 15 MG: 5 TABLET ORAL at 05:03

## 2017-03-19 RX ADMIN — Medication 3 ML: at 02:03

## 2017-03-19 RX ADMIN — BACITRACIN: 500 OINTMENT TOPICAL at 08:03

## 2017-03-19 RX ADMIN — HEPARIN SODIUM 5000 UNITS: 5000 INJECTION, SOLUTION INTRAVENOUS; SUBCUTANEOUS at 05:03

## 2017-03-19 RX ADMIN — Medication 3 ML: at 09:03

## 2017-03-19 RX ADMIN — OXYCODONE HYDROCHLORIDE 5 MG: 5 TABLET ORAL at 02:03

## 2017-03-19 RX ADMIN — OXYCODONE HYDROCHLORIDE 5 MG: 5 TABLET ORAL at 08:03

## 2017-03-19 RX ADMIN — ACETAMINOPHEN 650 MG: 500 TABLET ORAL at 07:03

## 2017-03-19 RX ADMIN — Medication 3 ML: at 05:03

## 2017-03-19 RX ADMIN — STANDARDIZED SENNA CONCENTRATE AND DOCUSATE SODIUM 1 TABLET: 8.6; 5 TABLET, FILM COATED ORAL at 08:03

## 2017-03-19 RX ADMIN — POTASSIUM CHLORIDE 40 MEQ: 1500 TABLET, EXTENDED RELEASE ORAL at 09:03

## 2017-03-19 RX ADMIN — HEPARIN SODIUM 5000 UNITS: 5000 INJECTION, SOLUTION INTRAVENOUS; SUBCUTANEOUS at 09:03

## 2017-03-19 RX ADMIN — OXYCODONE HYDROCHLORIDE 15 MG: 5 TABLET ORAL at 10:03

## 2017-03-19 RX ADMIN — OXYCODONE HYDROCHLORIDE 15 MG: 5 TABLET ORAL at 11:03

## 2017-03-19 RX ADMIN — OXYCODONE HYDROCHLORIDE 5 MG: 5 TABLET ORAL at 01:03

## 2017-03-19 NOTE — PROGRESS NOTES
ICU Progress Note  Neurocritical Care    Admit Date: 3/15/2017  LOS: 4    CC: Brain tumor    SUBJECTIVE:     Interval History/Significant Events:   No acute events overnight     Medications:  Continuous Infusions:   Scheduled Meds:   bacitracin   Topical (Top) BID    heparin (porcine)  5,000 Units Subcutaneous Q8H    hydrocortisone  10 mg Oral QHS    levetiracetam  500 mg Oral BID    potassium chloride  40 mEq Oral Daily    senna-docusate 8.6-50 mg  1 tablet Oral Daily    sodium chloride 0.9%  3 mL Intravenous Q8H     PRN Meds:.acetaminophen, ondansetron, oxycodone, oxycodone, sodium chloride 0.9%    OBJECTIVE:     I & O (24h):    Intake/Output Summary (Last 24 hours) at 03/19/17 1121  Last data filed at 03/19/17 1005   Gross per 24 hour   Intake             1520 ml   Output             2425 ml   Net             -905 ml       Physical Exam:  Last Vitals:  Vitals:    03/19/17 1005   BP:    Pulse: (!) 55   Resp: (!) 22   Temp:      Vital Signs (24h Range):   Temp:  [98.7 °F (37.1 °C)-99.2 °F (37.3 °C)] 99.2 °F (37.3 °C)  Pulse:  [49-69] 55  Resp:  [15-35] 22  SpO2:  [92 %-95 %] 94 %  BP: (111-127)/(61-81) 112/61  Arterial Line BP: ()/(59-88) 135/72  GA: Alert, comfortable, no acute distress.   HEENT: No scleral icterus or JVD.   Pulmonary: Clear to auscultation A/P/L. No wheezing, crackles, or rhonchi.  Cardiac: RRR S1 & S2 w/o rubs/murmurs/gallops.   Abdominal: Bowel sounds present x 4. No appreciable hepatosplenomegaly.  Skin: No jaundice, rashes, or visible lesions.  Neuro:  --GCS: E4 V5 M6  --Mental Status:  Alert and oriented x 4.  --CN II-XII grossly intact.   --Pupils 4mm, PERRL.  --L homonymous hemianopsia   --Corneal reflex, gag, cough intact.  --LUE strength: 5/5  --RUE strength: 5/5  --LLE strength: 5/5  --RLE strength: 5/5    --Arterial Line:        Arterial Line 03/15/17 Left Radial (Active)   Site Assessment Clean;Dry;Intact;No redness;No swelling 3/16/2017 11:00 AM   Line Status Pulsatile  "blood flow 3/16/2017 11:00 AM   Art Line Waveform Appropriate 3/16/2017 11:00 AM   Arterial Line Interventions Zeroed and calibrated;Leveled;Connections checked and tightened;Flushed per protocol 3/16/2017 11:00 AM   Color/Movement/Sensation Capillary refill less than 3 sec 3/16/2017  3:05 AM   Dressing Type Transparent 3/16/2017 11:00 AM   Dressing Status Biopatch in place;Clean;Dry;Intact 3/16/2017 11:00 AM   Dressing Intervention New dressing 3/15/2017 11:03 PM   Dressing Change Due 03/22/17 3/16/2017 11:00 AM       --Surgical Drains/Turcios:        Closed/Suction Drain 03/15/17 1321 Right Scalp Accordion 10 Fr. (Active)   Site Description Unable to view 3/16/2017 11:00 AM   Dressing Type [place] 3/16/2017 11:00 AM   Dressing Status Old drainage;Intact 3/16/2017 11:00 AM   Drainage Serosanguineous 3/16/2017 11:00 AM   Status Other (Comment) 3/16/2017 11:00 AM   Output (mL) 5 mL 3/16/2017  3:00 AM            Urethral Catheter 03/15/17 0823 Straight-tip;Non-latex 16 Fr. (Active)   Site Assessment Clean;Intact 3/16/2017  3:05 AM   Collection Container Urimeter 3/16/2017  3:05 AM   Securement Method secured to top of thigh w/ adhesive device 3/16/2017  3:05 AM   Catheter Care Performed yes 3/16/2017  3:05 AM   Reason for Continuing Urinary Catheterization Post operative 3/16/2017  3:05 AM   CAUTI Prevention Bundle StatLock in place w 1" slack;Intact seal between catheter & drainage tubing;Drainage bag off the floor;Green sheeting clip in use;No dependent loops or kinks;Drainage bag not overfilled (<2/3 full);Drainage bag below bladder 3/16/2017  3:05 AM   Output (mL) 43 mL 3/16/2017  1:00 PM       Nutrition: regular diet      Labs:  ABG: No results for input(s): PH, PO2, PCO2, HCO3, POCSATURATED, BE in the last 24 hours.    BMP:    Recent Labs  Lab 03/19/17  0522     138   K 3.4*  3.4*     103   CO2 23  23   BUN 12  12   CREATININE 0.8  0.8   GLU 95  95   MG 1.8   PHOS 2.9       LFT:   Lab " Results   Component Value Date    AST 38 03/19/2017     (H) 03/19/2017    ALKPHOS 85 03/19/2017    BILITOT 0.7 03/19/2017    ALBUMIN 3.0 (L) 03/19/2017    PROT 6.3 03/19/2017       CBC:   Lab Results   Component Value Date    WBC 8.09 03/19/2017    HGB 14.9 03/19/2017    HCT 43.3 03/19/2017    MCV 87 03/19/2017     03/19/2017       Microbiology x 7d:   Microbiology Results (last 7 days)     ** No results found for the last 168 hours. **            ASSESSMENT/PLAN:     1. Pituitary adenoma s/p resection  2. DI  3. Hypopituitarism     A/P:  - Neurological exam unchanged.  - Pain is under control, continue current regimen   - Continue to monitor urine output and Na level.  - Appreciate endocrine recs  - Better PO intake  - SQH   - Transfer to floor         Care level 3.

## 2017-03-19 NOTE — ASSESSMENT & PLAN NOTE
S/p craniotomy     hydrocortisone taper per Neurosurgery    Diabetes insipidus now resolved,   Recommend DDAVP only if all of the following parameters are met: (Serum NA >145, Urine Spec gravity <1.005, and Two consecutive urine o/p greater than 250 cc/hr)    TSH within normal range

## 2017-03-19 NOTE — PT/OT/SLP PROGRESS
"Physical Therapy      Roldan Moulton  MRN: [id number]    Patient not seen today secondary to Patient unwilling to participate, Pain. Pt reports "it's not worth any unnecessary movement" in regards to pain & benefits. Family at bedside reports "the pain is worse than what it has been." RN notified and aware, stating pt just received pain meds prior to PT visit.  Will follow-up at next scheduled visit.    Leann Muse, PT   03/19/2017      "

## 2017-03-19 NOTE — SUBJECTIVE & OBJECTIVE
"Interval HPI:   Overnight events: desmopressin given for increased urine output, however sodium within normal range  Eatin%  Nausea: No  Hypoglycemia and intervention: No  Fever: No  TPN and/or TF: No  If yes, type of TF/TPN and rate: n/a    /61 (BP Location: Right arm, Patient Position: Lying, BP Method: Automatic)  Pulse (!) 53  Temp 99.2 °F (37.3 °C) (Oral)   Resp (!) 24  Ht 5' 10" (1.778 m)  Wt 89.2 kg (196 lb 10.4 oz)  SpO2 (!) 93%  BMI 28.22 kg/m2    Labs Reviewed and Include      Recent Labs  Lab 17  0522   GLU 95  95   CALCIUM 8.8  8.8   ALBUMIN 3.0*   PROT 6.3     138   K 3.4*  3.4*   CO2 23  23     103   BUN 12  12   CREATININE 0.8  0.8   ALKPHOS 85   *   AST 38   BILITOT 0.7     Lab Results   Component Value Date    WBC 8.09 2017    HGB 14.9 2017    HCT 43.3 2017    MCV 87 2017     2017       Recent Labs  Lab 17  1238   TSH 0.972   FREET4 1.02     No results found for: HGBA1C    Nutritional status:   Body mass index is 28.22 kg/(m^2).  Lab Results   Component Value Date    ALBUMIN 3.0 (L) 2017    ALBUMIN 2.9 (L) 2017    ALBUMIN 3.1 (L) 2017     No results found for: PREALBUMIN    Estimated Creatinine Clearance: 139.4 mL/min (based on Cr of 0.8).    Accu-Checks  No results for input(s): POCTGLUCOSE in the last 72 hours.    Current Medications and/or Treatments Impacting Glycemic Control  Immunotherapy:  Immunosuppressants     None        Steroids:   Hormones     Start     Stop Route Frequency Ordered    17 2100  hydrocortisone tablet 10 mg      -- Oral Nightly 03/15/17 1437        Pressors:    Autonomic Drugs     None        Hyperglycemia/Diabetes Medications: Antihyperglycemics     None        "

## 2017-03-19 NOTE — PROGRESS NOTES
Progress Note  Neurosurgery    Admit Date: 3/15/2017  Post-operative Day: 4 Days Post-Op  Hospital Day: 5    SUBJECTIVE:     Roldan Moulton is a 41 y.o. male s/p right frontotemporal crani for resection of sellar mass.  Follow-up For:  Procedure(s) (LRB):  CRANIOTOMY WITH STEALTH, RIGHT FRONTOTEMPORAL CRANIOTOMY for tumor (Right)    NAEON    Scheduled Meds:   bacitracin   Topical (Top) BID    heparin (porcine)  5,000 Units Subcutaneous Q8H    hydrocortisone  10 mg Oral QHS    levetiracetam  500 mg Oral BID    senna-docusate 8.6-50 mg  1 tablet Oral Daily    sodium chloride 0.9%  3 mL Intravenous Q8H     Continuous Infusions:   PRN Meds:acetaminophen, ondansetron, oxycodone, oxycodone, sodium chloride 0.9%    Review of patient's allergies indicates:  No Known Allergies    OBJECTIVE:     Vital Signs (Most Recent)  Temp: 99.2 °F (37.3 °C) (03/19/17 0305)  Pulse: (!) 51 (03/19/17 0705)  Resp: (!) 35 (03/19/17 0705)  BP: 112/61 (03/19/17 0305)  SpO2: (!) 93 % (03/19/17 0705)    Vital Signs Range (Last 24H):  Temp:  [98.7 °F (37.1 °C)-99.2 °F (37.3 °C)]   Pulse:  [49-86]   Resp:  [15-39]   BP: (107-127)/(60-81)   SpO2:  [92 %-95 %]   Arterial Line BP: ()/(58-88)     I & O (Last 24H):    Intake/Output Summary (Last 24 hours) at 03/19/17 0812  Last data filed at 03/19/17 0600   Gross per 24 hour   Intake             1510 ml   Output             1955 ml   Net             -445 ml     Physical Exam:  AAOX3, speech fluent  PERRL, EOMI, face symm, tongue midline  Left homonymous hemianopsia  CARLIN symm, FC x4  SILT  No drift    Lines/Drains:       Peripheral IV - Single Lumen 03/15/17 0725 Left Hand (Active)   Site Assessment Clean;Dry;Intact 3/16/2017  7:01 AM   Line Status Infusing 3/16/2017  7:01 AM   Dressing Status Clean;Dry;Intact 3/16/2017  7:01 AM   Dressing Intervention New dressing 3/15/2017  4:00 PM   Dressing Change Due 03/19/17 3/16/2017  3:05 AM   Site Change Due 03/19/17 3/16/2017  7:01 AM   Reason Not  Rotated Not due 3/16/2017  3:05 AM   Number of days:1            Peripheral IV - Single Lumen 03/15/17 0826 Right Hand (Active)   Site Assessment Clean;Dry;Intact 3/16/2017  7:01 AM   Line Status Infusing 3/16/2017  7:01 AM   Dressing Status Clean;Dry;Intact 3/16/2017  7:01 AM   Dressing Intervention New dressing 3/15/2017  4:00 PM   Dressing Change Due 03/19/17 3/16/2017  3:05 AM   Site Change Due 03/19/17 3/16/2017  7:01 AM   Reason Not Rotated Not due 3/16/2017  3:05 AM   Number of days:1            Arterial Line 03/15/17 Left Radial (Active)   Site Assessment Clean;Dry;Intact;No redness;No swelling 3/16/2017  7:01 AM   Line Status Pulsatile blood flow 3/16/2017  7:01 AM   Art Line Waveform Appropriate 3/16/2017  7:01 AM   Arterial Line Interventions Zeroed and calibrated;Leveled;Connections checked and tightened;Flushed per protocol 3/16/2017  7:01 AM   Color/Movement/Sensation Capillary refill less than 3 sec 3/16/2017  3:05 AM   Dressing Type Transparent 3/16/2017  7:01 AM   Dressing Status Biopatch in place;Clean;Dry;Intact 3/16/2017  7:01 AM   Dressing Intervention New dressing 3/15/2017 11:03 PM   Dressing Change Due 03/22/17 3/16/2017  7:01 AM   Number of days:1            Closed/Suction Drain 03/15/17 1321 Right Scalp Accordion 10 Fr. (Active)   Site Description Unable to view 3/16/2017  7:01 AM   Dressing Type [place] 3/16/2017  7:01 AM   Dressing Status Old drainage;Intact 3/16/2017  7:01 AM   Drainage Serosanguineous 3/16/2017  7:01 AM   Status Other (Comment) 3/16/2017  7:01 AM   Output (mL) 5 mL 3/16/2017  3:00 AM   Number of days:0            Urethral Catheter 03/15/17 0823 Straight-tip;Non-latex 16 Fr. (Active)   Site Assessment Clean;Intact 3/16/2017  3:05 AM   Collection Container Urimeter 3/16/2017  3:05 AM   Securement Method secured to top of thigh w/ adhesive device 3/16/2017  3:05 AM   Catheter Care Performed yes 3/16/2017  3:05 AM   Reason for Continuing Urinary Catheterization Post  "operative 3/16/2017  3:05 AM   CAUTI Prevention Bundle StatLock in place w 1" slack;Intact seal between catheter & drainage tubing;Drainage bag off the floor;Green sheeting clip in use;No dependent loops or kinks;Drainage bag not overfilled (<2/3 full);Drainage bag below bladder 3/16/2017  3:05 AM   Output (mL) 45 mL 3/16/2017 11:00 AM   Number of days:1       Wound/Incision:  C/d/i    Laboratory:  CBC:     Recent Labs  Lab 03/19/17  0522   WBC 8.09   RBC 5.00   HGB 14.9   HCT 43.3      MCV 87   MCH 29.8   MCHC 34.4     CMP:     Recent Labs  Lab 03/19/17  0522   GLU 95  95   CALCIUM 8.8  8.8   ALBUMIN 3.0*   PROT 6.3     138   K 3.4*  3.4*   CO2 23  23     103   BUN 12  12   CREATININE 0.8  0.8   ALKPHOS 85   *   AST 38   BILITOT 0.7     Coagulation:     Recent Labs  Lab 03/15/17  0700 03/16/17  0303   INR 0.9 1.0   APTT 26.8  --      Cardiac markers: No results for input(s): CKMB, CPKMB, TROPONINT, TROPONINI, MYOGLOBIN in the last 168 hours.    Recent Labs  Lab 03/17/17  1614   COLORU Straw   SPECGRAV 1.005   PHUR >8.0*   PROTEINUA Negative   NITRITE Negative   LEUKOCYTESUR Negative   UROBILINOGEN Negative         Diagnostic Results:  CT head: reviewed, stable postop changes.    ASSESSMENT/PLAN:     Assessment: 41 M s/p crani for resection of sellar mass.  -Neuro stable.  -SBP less than 150  -Keppra  -Continue hydrocortisone taper.  -Ok for icepak to right eye for swelling.  -DI: resolved, continue q6 BMP.  Consider ddAVP if uo greater than 250 cc/hr, spec grav less than 1.005 and Na above 145.  -ADAT, replete lytes PRN  -SQH.  -OOB ambulating.  -TTF under NSGY today.  "

## 2017-03-19 NOTE — PROGRESS NOTES
"[facility]  Endocrinology  Progress Note    Admit Date: 3/15/2017     Consult requested by: WALLY Kidd MD    Reason for Consult: DI    HPI:  Patient is a 40 y/o male s/p craniotomy for recurrent pituitary adenoma.  Underwent initial Transphenoidal Resection in 2015 and subsequent revision.  This is his third procedure.  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  He developed post-operative DI after his second procedure and discharged on DDAVP.  He has had first two procedures completed at [facility] in [place].  His wife is unsure if he is still taking this medication.  He also developed secondary hypogonadism and take Testosterone Cypionate 100 mg q week.  Post operatively, patient has DI and has received one dose of DDAVP 10 mcg intrranasally          Interval HPI:   Overnight events: desmopressin given for increased urine output, however sodium within normal range  Eatin%  Nausea: No  Hypoglycemia and intervention: No  Fever: No  TPN and/or TF: No  If yes, type of TF/TPN and rate: n/a    /61 (BP Location: Right arm, Patient Position: Lying, BP Method: Automatic)  Pulse (!) 53  Temp 99.2 °F (37.3 °C) (Oral)   Resp (!) 24  Ht 5' 10" (1.778 m)  Wt 89.2 kg (196 lb 10.4 oz)  SpO2 (!) 93%  BMI 28.22 kg/m2    Labs Reviewed and Include      Recent Labs  Lab 17  0522   GLU 95  95   CALCIUM 8.8  8.8   ALBUMIN 3.0*   PROT 6.3     138   K 3.4*  3.4*   CO2 23  23     103   BUN 12  12   CREATININE 0.8  0.8   ALKPHOS 85   *   AST 38   BILITOT 0.7     Lab Results   Component Value Date    WBC 8.09 2017    HGB 14.9 2017    HCT 43.3 2017    MCV 87 2017     2017       Recent Labs  Lab 17  1238   TSH 0.972   FREET4 1.02     No results found for: HGBA1C    Nutritional status:   Body mass index is 28.22 kg/(m^2).  Lab Results   Component Value Date    " ALBUMIN 3.0 (L) 03/19/2017    ALBUMIN 2.9 (L) 03/18/2017    ALBUMIN 3.1 (L) 03/17/2017     No results found for: PREALBUMIN    Estimated Creatinine Clearance: 139.4 mL/min (based on Cr of 0.8).    Accu-Checks  No results for input(s): POCTGLUCOSE in the last 72 hours.    Current Medications and/or Treatments Impacting Glycemic Control  Immunotherapy:  Immunosuppressants     None        Steroids:   Hormones     Start     Stop Route Frequency Ordered    03/18/17 2100  hydrocortisone tablet 10 mg      -- Oral Nightly 03/15/17 1437        Pressors:    Autonomic Drugs     None        Hyperglycemia/Diabetes Medications: Antihyperglycemics     None          ASSESSMENT and PLAN    Pituitary adenoma  S/p craniotomy     hydrocortisone taper per Neurosurgery    Diabetes insipidus now resolved,   Recommend DDAVP only if all of the following parameters are met: (Serum NA >145, Urine Spec gravity <1.005, and Two consecutive urine o/p greater than 250 cc/hr)    TSH within normal range      Secondary male hypogonadism  Resume weekly testosterone following discharge      Primary central diabetes insipidus, resolved as of 3/19/2017  Recommend patient have strict i/o    -no further DI noted    will schedule follow-up in pituitary clinic within 1-2 weeks of discharge. Signing off at this time, please call with further questions.    Cyril Guido MD  Endocrinology  [facility]

## 2017-03-19 NOTE — PROGRESS NOTES
Pt c/o HA pain 8/10. Increased swelling noted to R face & right eye. Notified Dr. Holland. MD @ bedside. MD gave verbal order with read back for one time dose of Oxycodone IR 5 mg PO. MD also instructed RN to notify neuro surgery. Orders placed and neurosurgery paged. Will continue to monitor.

## 2017-03-20 ENCOUNTER — TELEPHONE (OUTPATIENT)
Dept: ENDOCRINOLOGY | Facility: CLINIC | Age: 42
End: 2017-03-20

## 2017-03-20 LAB
ALBUMIN SERPL BCP-MCNC: 3.3 G/DL
ALP SERPL-CCNC: 103 U/L
ALT SERPL W/O P-5'-P-CCNC: 71 U/L
ANION GAP SERPL CALC-SCNC: 15 MMOL/L
ANION GAP SERPL CALC-SCNC: 15 MMOL/L
AST SERPL-CCNC: 21 U/L
BASOPHILS # BLD AUTO: 0.04 K/UL
BASOPHILS NFR BLD: 0.4 %
BILIRUB SERPL-MCNC: 0.9 MG/DL
BUN SERPL-MCNC: 9 MG/DL
BUN SERPL-MCNC: 9 MG/DL
CALCIUM SERPL-MCNC: 9.6 MG/DL
CALCIUM SERPL-MCNC: 9.6 MG/DL
CHLORIDE SERPL-SCNC: 102 MMOL/L
CHLORIDE SERPL-SCNC: 102 MMOL/L
CO2 SERPL-SCNC: 22 MMOL/L
CO2 SERPL-SCNC: 22 MMOL/L
CREAT SERPL-MCNC: 0.9 MG/DL
CREAT SERPL-MCNC: 0.9 MG/DL
DIFFERENTIAL METHOD: ABNORMAL
EOSINOPHIL # BLD AUTO: 0.1 K/UL
EOSINOPHIL NFR BLD: 1.4 %
ERYTHROCYTE [DISTWIDTH] IN BLOOD BY AUTOMATED COUNT: 12.9 %
EST. GFR  (AFRICAN AMERICAN): >60 ML/MIN/1.73 M^2
EST. GFR  (AFRICAN AMERICAN): >60 ML/MIN/1.73 M^2
EST. GFR  (NON AFRICAN AMERICAN): >60 ML/MIN/1.73 M^2
EST. GFR  (NON AFRICAN AMERICAN): >60 ML/MIN/1.73 M^2
GLUCOSE SERPL-MCNC: 97 MG/DL
GLUCOSE SERPL-MCNC: 97 MG/DL
HCT VFR BLD AUTO: 46.5 %
HGB BLD-MCNC: 16.8 G/DL
LYMPHOCYTES # BLD AUTO: 3 K/UL
LYMPHOCYTES NFR BLD: 30.1 %
MAGNESIUM SERPL-MCNC: 2.1 MG/DL
MCH RBC QN AUTO: 30.9 PG
MCHC RBC AUTO-ENTMCNC: 36.1 %
MCV RBC AUTO: 86 FL
MONOCYTES # BLD AUTO: 0.8 K/UL
MONOCYTES NFR BLD: 8.3 %
NEUTROPHILS # BLD AUTO: 6 K/UL
NEUTROPHILS NFR BLD: 59.5 %
PHOSPHATE SERPL-MCNC: 3.5 MG/DL
PLATELET # BLD AUTO: 309 K/UL
PMV BLD AUTO: 10.4 FL
POCT GLUCOSE: 94 MG/DL (ref 70–110)
POTASSIUM SERPL-SCNC: 4 MMOL/L
POTASSIUM SERPL-SCNC: 4 MMOL/L
PROT SERPL-MCNC: 7.2 G/DL
RBC # BLD AUTO: 5.44 M/UL
SODIUM SERPL-SCNC: 139 MMOL/L
SODIUM SERPL-SCNC: 139 MMOL/L
WBC # BLD AUTO: 10.09 K/UL

## 2017-03-20 PROCEDURE — 25000003 PHARM REV CODE 250: Performed by: PHYSICIAN ASSISTANT

## 2017-03-20 PROCEDURE — 99233 SBSQ HOSP IP/OBS HIGH 50: CPT | Mod: ,,, | Performed by: PSYCHIATRY & NEUROLOGY

## 2017-03-20 PROCEDURE — 83735 ASSAY OF MAGNESIUM: CPT

## 2017-03-20 PROCEDURE — 25000003 PHARM REV CODE 250: Performed by: STUDENT IN AN ORGANIZED HEALTH CARE EDUCATION/TRAINING PROGRAM

## 2017-03-20 PROCEDURE — 80053 COMPREHEN METABOLIC PANEL: CPT

## 2017-03-20 PROCEDURE — 27000221 HC OXYGEN, UP TO 24 HOURS

## 2017-03-20 PROCEDURE — 84100 ASSAY OF PHOSPHORUS: CPT

## 2017-03-20 PROCEDURE — 85025 COMPLETE CBC W/AUTO DIFF WBC: CPT

## 2017-03-20 PROCEDURE — 11000001 HC ACUTE MED/SURG PRIVATE ROOM

## 2017-03-20 RX ADMIN — HEPARIN SODIUM 5000 UNITS: 5000 INJECTION, SOLUTION INTRAVENOUS; SUBCUTANEOUS at 05:03

## 2017-03-20 RX ADMIN — LEVETIRACETAM 500 MG: 500 TABLET, FILM COATED ORAL at 08:03

## 2017-03-20 RX ADMIN — HYPROMELLOSE 2910 1 DROP: 5 SOLUTION OPHTHALMIC at 12:03

## 2017-03-20 RX ADMIN — HYDROCORTISONE 10 MG: 10 TABLET ORAL at 08:03

## 2017-03-20 RX ADMIN — HEPARIN SODIUM 5000 UNITS: 5000 INJECTION, SOLUTION INTRAVENOUS; SUBCUTANEOUS at 11:03

## 2017-03-20 RX ADMIN — OXYCODONE HYDROCHLORIDE 5 MG: 5 TABLET ORAL at 10:03

## 2017-03-20 RX ADMIN — Medication 3 ML: at 02:03

## 2017-03-20 RX ADMIN — OXYCODONE HYDROCHLORIDE 5 MG: 5 TABLET ORAL at 07:03

## 2017-03-20 RX ADMIN — HEPARIN SODIUM 5000 UNITS: 5000 INJECTION, SOLUTION INTRAVENOUS; SUBCUTANEOUS at 02:03

## 2017-03-20 RX ADMIN — BACITRACIN: 500 OINTMENT TOPICAL at 08:03

## 2017-03-20 RX ADMIN — STANDARDIZED SENNA CONCENTRATE AND DOCUSATE SODIUM 1 TABLET: 8.6; 5 TABLET, FILM COATED ORAL at 08:03

## 2017-03-20 RX ADMIN — OXYCODONE HYDROCHLORIDE 15 MG: 5 TABLET ORAL at 02:03

## 2017-03-20 RX ADMIN — OXYCODONE HYDROCHLORIDE 5 MG: 5 TABLET ORAL at 02:03

## 2017-03-20 RX ADMIN — OXYCODONE HYDROCHLORIDE 15 MG: 5 TABLET ORAL at 06:03

## 2017-03-20 RX ADMIN — Medication 3 ML: at 05:03

## 2017-03-20 NOTE — PROGRESS NOTES
Progress Note  Neurosurgery    Admit Date: 3/15/2017  Post-operative Day: 5 Days Post-Op  Hospital Day: 6    SUBJECTIVE:     Roldan Moulton is a 41 y.o. male s/p right frontotemporal crani for resection of sellar mass.  Follow-up For:  Procedure(s) (LRB):  CRANIOTOMY WITH STEALTH, RIGHT FRONTOTEMPORAL CRANIOTOMY for tumor (Right)    NAEON, no ddAVP required overnight.  Na and urine output has remained stable.  Scheduled Meds:   bacitracin   Topical (Top) BID    heparin (porcine)  5,000 Units Subcutaneous Q8H    hydrocortisone  10 mg Oral QHS    levetiracetam  500 mg Oral BID    potassium chloride  40 mEq Oral Daily    senna-docusate 8.6-50 mg  1 tablet Oral Daily    sodium chloride 0.9%  3 mL Intravenous Q8H     Continuous Infusions:   PRN Meds:acetaminophen, ondansetron, oxycodone, oxycodone, sodium chloride 0.9%    Review of patient's allergies indicates:  No Known Allergies    OBJECTIVE:     Vital Signs (Most Recent)  Temp: 98.7 °F (37.1 °C) (03/20/17 0703)  Pulse: 60 (03/20/17 0703)  Resp: 16 (03/20/17 0703)  BP: 138/62 (03/20/17 0703)  SpO2: (!) 94 % (03/20/17 0703)    Vital Signs Range (Last 24H):  Temp:  [98.2 °F (36.8 °C)-99.8 °F (37.7 °C)]   Pulse:  [51-88]   Resp:  [13-31]   BP: ()/(56-80)   SpO2:  [91 %-99 %]   Arterial Line BP: (135-344)/()     I & O (Last 24H):    Intake/Output Summary (Last 24 hours) at 03/20/17 0756  Last data filed at 03/20/17 0632   Gross per 24 hour   Intake             2755 ml   Output             3750 ml   Net             -995 ml     Physical Exam:  AAOX3, speech fluent  PERRL, EOMI, face symm, tongue midline  Left homonymous hemianopsia  CARLIN symm, FC x4  SILT  No drift    Lines/Drains:       Peripheral IV - Single Lumen 03/15/17 0725 Left Hand (Active)   Site Assessment Clean;Dry;Intact 3/16/2017  7:01 AM   Line Status Infusing 3/16/2017  7:01 AM   Dressing Status Clean;Dry;Intact 3/16/2017  7:01 AM   Dressing Intervention New dressing 3/15/2017  4:00 PM    Dressing Change Due 03/19/17 3/16/2017  3:05 AM   Site Change Due 03/19/17 3/16/2017  7:01 AM   Reason Not Rotated Not due 3/16/2017  3:05 AM   Number of days:1            Peripheral IV - Single Lumen 03/15/17 0826 Right Hand (Active)   Site Assessment Clean;Dry;Intact 3/16/2017  7:01 AM   Line Status Infusing 3/16/2017  7:01 AM   Dressing Status Clean;Dry;Intact 3/16/2017  7:01 AM   Dressing Intervention New dressing 3/15/2017  4:00 PM   Dressing Change Due 03/19/17 3/16/2017  3:05 AM   Site Change Due 03/19/17 3/16/2017  7:01 AM   Reason Not Rotated Not due 3/16/2017  3:05 AM   Number of days:1            Arterial Line 03/15/17 Left Radial (Active)   Site Assessment Clean;Dry;Intact;No redness;No swelling 3/16/2017  7:01 AM   Line Status Pulsatile blood flow 3/16/2017  7:01 AM   Art Line Waveform Appropriate 3/16/2017  7:01 AM   Arterial Line Interventions Zeroed and calibrated;Leveled;Connections checked and tightened;Flushed per protocol 3/16/2017  7:01 AM   Color/Movement/Sensation Capillary refill less than 3 sec 3/16/2017  3:05 AM   Dressing Type Transparent 3/16/2017  7:01 AM   Dressing Status Biopatch in place;Clean;Dry;Intact 3/16/2017  7:01 AM   Dressing Intervention New dressing 3/15/2017 11:03 PM   Dressing Change Due 03/22/17 3/16/2017  7:01 AM   Number of days:1            Closed/Suction Drain 03/15/17 1321 Right Scalp Accordion 10 Fr. (Active)   Site Description Unable to view 3/16/2017  7:01 AM   Dressing Type [place] 3/16/2017  7:01 AM   Dressing Status Old drainage;Intact 3/16/2017  7:01 AM   Drainage Serosanguineous 3/16/2017  7:01 AM   Status Other (Comment) 3/16/2017  7:01 AM   Output (mL) 5 mL 3/16/2017  3:00 AM   Number of days:0            Urethral Catheter 03/15/17 0823 Straight-tip;Non-latex 16 Fr. (Active)   Site Assessment Clean;Intact 3/16/2017  3:05 AM   Collection Container Urimeter 3/16/2017  3:05 AM   Securement Method secured to top of thigh w/ adhesive device 3/16/2017   "3:05 AM   Catheter Care Performed yes 3/16/2017  3:05 AM   Reason for Continuing Urinary Catheterization Post operative 3/16/2017  3:05 AM   CAUTI Prevention Bundle StatLock in place w 1" slack;Intact seal between catheter & drainage tubing;Drainage bag off the floor;Green sheeting clip in use;No dependent loops or kinks;Drainage bag not overfilled (<2/3 full);Drainage bag below bladder 3/16/2017  3:05 AM   Output (mL) 45 mL 3/16/2017 11:00 AM   Number of days:1       Wound/Incision:  C/d/i    Laboratory:  CBC:     Recent Labs  Lab 03/20/17  0520   WBC 10.09   RBC 5.44   HGB 16.8   HCT 46.5      MCV 86   MCH 30.9   MCHC 36.1*     CMP:     Recent Labs  Lab 03/20/17  0520   GLU 97  97   CALCIUM 9.6  9.6   ALBUMIN 3.3*   PROT 7.2     139   K 4.0  4.0   CO2 22*  22*     102   BUN 9  9   CREATININE 0.9  0.9   ALKPHOS 103   ALT 71*   AST 21   BILITOT 0.9     Coagulation:     Recent Labs  Lab 03/15/17  0700 03/16/17  0303   INR 0.9 1.0   APTT 26.8  --      Cardiac markers: No results for input(s): CKMB, CPKMB, TROPONINT, TROPONINI, MYOGLOBIN in the last 168 hours.    Recent Labs  Lab 03/17/17  1614   COLORU Straw   SPECGRAV 1.005   PHUR >8.0*   PROTEINUA Negative   NITRITE Negative   LEUKOCYTESUR Negative   UROBILINOGEN Negative         Diagnostic Results:  CT head: reviewed, stable postop changes.    ASSESSMENT/PLAN:     Assessment: 41 M s/p crani for resection of sellar mass.  -Neuro stable.  -SBP less than 150  -Keppra  -Continue hydrocortisone taper.  -Ok for icepak to right eye for swelling.  -DI: resolved.  Consider ddAVP if uo greater than 250 cc/hr, spec grav less than 1.005 and Na above 145.  -ADAT, replete lytes PRN  -SQH.  -OOB ambulating.  -TTF under NSGY, awaiting floor bed.  "

## 2017-03-20 NOTE — PROGRESS NOTES
ICU Progress Note  Neurocritical Care    Admit Date: 3/15/2017  LOS: 5    CC: Brain tumor    SUBJECTIVE:     Interval History/Significant Events: Mr. Roldan Moulton is a 42yo male with past history significant for pituitary adenoma s/p transphenoidal resection on 06/24/2015. 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 He is otherwise healthy, denies HTN, DMII. He does get testosterone injections. Of note, patient had DI post surgically last time which was treated with DDAVP.     POD 5 s/p R frontotemporal craniotomy for excision of sellar tumor.  Endocrinology following. Boarding for neurosurgery.     Review of Symptoms: + headache   Constitutional: Denies fevers or chills.  Pulmonary: Denies shortness of breath or cough.  Cardiology: Denies chest pain or palpitations.  GI: Denies abdominal pain or constipation.  Neurologic: Denies new weakness, headaches, or paresthesias.     Medications:  Continuous Infusions:   Scheduled Meds:   bacitracin   Topical (Top) BID    heparin (porcine)  5,000 Units Subcutaneous Q8H    hydrocortisone  10 mg Oral QHS    levetiracetam  500 mg Oral BID    potassium chloride  40 mEq Oral Daily    senna-docusate 8.6-50 mg  1 tablet Oral Daily    sodium chloride 0.9%  3 mL Intravenous Q8H     PRN Meds:.acetaminophen, artificial tears, ondansetron, oxycodone, oxycodone, sodium chloride 0.9%    OBJECTIVE:     I & O (24h):    Intake/Output Summary (Last 24 hours) at 03/20/17 1455  Last data filed at 03/20/17 0632   Gross per 24 hour   Intake             2750 ml   Output             2750 ml   Net                0 ml       Physical Exam:  Last Vitals:  Vitals:    03/20/17 1300   BP: 108/64   Pulse: 75   Resp:     Temp:      Vital Signs (24h Range):   Temp:  [98.2 °F (36.8 °C)-99.8 °F (37.7 °C)] 98.8 °F (37.1 °C)  Pulse:  [60-88] 75  Resp:  [13-29] 23  SpO2:  [91 %-99 %] 96 %  BP: ()/(56-80) 108/64  Arterial Line BP: (144-157)/(71-77) 144/75  GA: Alert, comfortable, no acute distress.   HEENT: No scleral icterus or JVD.   Pulmonary: Clear to auscultation A/P/L. No wheezing, crackles, or rhonchi.  Cardiac: RRR S1 & S2 w/o rubs/murmurs/gallops.   Abdominal: Bowel sounds present x 4. No appreciable hepatosplenomegaly.  Skin: No jaundice, rashes, or visible lesions.  Neuro:  --GCS: E4 V5 M6  --Mental Status:  Alert and oriented x 4.  --CN II-XII grossly intact.   --Pupils 4mm, PERRL.  --L homonymous hemianopsia   --Corneal reflex, gag, cough intact.  --LUE strength: 5/5  --RUE strength: 5/5  --LLE strength: 5/5  --RLE strength: 5/5    --Arterial Line:        Arterial Line 03/15/17 Left Radial (Active)   Site Assessment Clean;Dry;Intact;No redness;No swelling 3/16/2017 11:00 AM   Line Status Pulsatile blood flow 3/16/2017 11:00 AM   Art Line Waveform Appropriate 3/16/2017 11:00 AM   Arterial Line Interventions Zeroed and calibrated;Leveled;Connections checked and tightened;Flushed per protocol 3/16/2017 11:00 AM   Color/Movement/Sensation Capillary refill less than 3 sec 3/16/2017  3:05 AM   Dressing Type Transparent 3/16/2017 11:00 AM   Dressing Status Biopatch in place;Clean;Dry;Intact 3/16/2017 11:00 AM   Dressing Intervention New dressing 3/15/2017 11:03 PM   Dressing Change Due 03/22/17 3/16/2017 11:00 AM       --Surgical Drains/[place]:        Closed/Suction Drain 03/15/17 1321 Right Scalp Accordion 10 Fr. (Active)   Site Description Unable to view 3/16/2017 11:00 AM   Dressing Type [place] 3/16/2017 11:00 AM   Dressing Status Old drainage;Intact 3/16/2017 11:00 AM   Drainage Serosanguineous 3/16/2017 11:00 AM   Status Other (Comment) 3/16/2017 11:00 AM   Output (mL) 5 mL 3/16/2017  3:00 AM            Urethral  "Catheter 03/15/17 0823 Straight-tip;Non-latex 16 Fr. (Active)   Site Assessment Clean;Intact 3/16/2017  3:05 AM   Collection Container Urimeter 3/16/2017  3:05 AM   Securement Method secured to top of thigh w/ adhesive device 3/16/2017  3:05 AM   Catheter Care Performed yes 3/16/2017  3:05 AM   Reason for Continuing Urinary Catheterization Post operative 3/16/2017  3:05 AM   CAUTI Prevention Bundle StatLock in place w 1" slack;Intact seal between catheter & drainage tubing;Drainage bag off the floor;Green sheeting clip in use;No dependent loops or kinks;Drainage bag not overfilled (<2/3 full);Drainage bag below bladder 3/16/2017  3:05 AM   Output (mL) 43 mL 3/16/2017  1:00 PM       Nutrition: regular diet      Labs:  ABG: No results for input(s): PH, PO2, PCO2, HCO3, POCSATURATED, BE in the last 24 hours.    BMP:    Recent Labs  Lab 03/20/17  0520     139   K 4.0  4.0     102   CO2 22*  22*   BUN 9  9   CREATININE 0.9  0.9   GLU 97  97   MG 2.1   PHOS 3.5       LFT:   Lab Results   Component Value Date    AST 21 03/20/2017    ALT 71 (H) 03/20/2017    ALKPHOS 103 03/20/2017    BILITOT 0.9 03/20/2017    ALBUMIN 3.3 (L) 03/20/2017    PROT 7.2 03/20/2017       CBC:   Lab Results   Component Value Date    WBC 10.09 03/20/2017    HGB 16.8 03/20/2017    HCT 46.5 03/20/2017    MCV 86 03/20/2017     03/20/2017       Microbiology x 7d:   Microbiology Results (last 7 days)     ** No results found for the last 168 hours. **          Imaging:  I have personally reviewed.     ASSESSMENT/PLAN:     Patient Active Problem List   Diagnosis    Brain tumor    Pituitary adenoma    Secondary male hypogonadism       Neuro:   POD 5 s/p R frontotemporal craniotomy excision of sellar tumor  - pathology from previous resection showing pituitary adenoma  - NSGY following  - continue hydrocortisone 10 mg daily   - continue Keppra 500mg BID  - post op CTH with post surgical changes and pneumocephalus      Pulmonary: "   - adequate sats on RA      Cardiac:   - SBP goal 100-160      Renal:    - BUN, creat stable  - UOP now stable  - KCl 40 meq daily      ID:   - no leukocytosis   - afebrile      Hem/Onc:   - H/H WNL  - platelets 309     Endocrine:    DI  - endocrinology following  - DI resolved       Fluids/Electrolytes/Nutrition/GI:   - regular diet     Level II    Shannon Morris PA-C  Neuro Critical Care  [id number]

## 2017-03-20 NOTE — PT/OT/SLP PROGRESS
Physical Therapy      Roldan Moulton  MRN: [id number]    Patient not seen today secondary to Patient unwilling to participate, Pain. PT attempted to visit pt for tx session this AM. Pt reported having a headache and not agreeable to OOB activity. Pt educated on importance of increasing activity and participation in therapy. Pt verbalized understanding. Will follow up at next scheduled visit pending pain control.     Mariana Mancia, PT, DPT   3/20/2017  Pager: [phone]

## 2017-03-20 NOTE — PLAN OF CARE
03/20/17 1529   Right Care Assessment   Can the patient answer the patient profile reliably? Yes, cognitively intact   How often would a person be available to care for the patient? Whenever needed   Describe the patient's ability to walk at the present time. No restrictions   How does the patient rate their overall health at the present time? Good   Number of comorbid conditions (as recorded on the chart) None   During the past month, has the patient often been bothered by feeling down, depressed or hopeless? No   During the past month, has the patient often been bothered by little interest or pleasure in doing things? No       Plan A:  Home    Plan B:  Home    Cyndi Garcia RN, CCRN-K, [facility]  Neuro-Critical Care   [phone]

## 2017-03-20 NOTE — TELEPHONE ENCOUNTER
----- Message from João Thacker RN sent at 3/20/2017  6:53 AM CDT -----      ----- Message -----     From: Cyril Guido MD     Sent: 3/19/2017  11:39 AM       To: [facility] Staff    Please schedule patient for pituitary clinic appointment within the next 2 weeks. Patient was seen in the hospital following pituitary surgery. Thanks.

## 2017-03-21 LAB
ALBUMIN SERPL BCP-MCNC: 3.5 G/DL
ALP SERPL-CCNC: 122 U/L
ALT SERPL W/O P-5'-P-CCNC: 64 U/L
ANION GAP SERPL CALC-SCNC: 17 MMOL/L
AST SERPL-CCNC: 28 U/L
BASOPHILS # BLD AUTO: 0.08 K/UL
BASOPHILS NFR BLD: 0.7 %
BILIRUB SERPL-MCNC: 1.2 MG/DL
BUN SERPL-MCNC: 11 MG/DL
CALCIUM SERPL-MCNC: 9.9 MG/DL
CHLORIDE SERPL-SCNC: 100 MMOL/L
CO2 SERPL-SCNC: 19 MMOL/L
CREAT SERPL-MCNC: 0.9 MG/DL
DIFFERENTIAL METHOD: ABNORMAL
EOSINOPHIL # BLD AUTO: 0.2 K/UL
EOSINOPHIL NFR BLD: 1.4 %
ERYTHROCYTE [DISTWIDTH] IN BLOOD BY AUTOMATED COUNT: 12.9 %
EST. GFR  (AFRICAN AMERICAN): >60 ML/MIN/1.73 M^2
EST. GFR  (NON AFRICAN AMERICAN): >60 ML/MIN/1.73 M^2
GLUCOSE SERPL-MCNC: 89 MG/DL
HCT VFR BLD AUTO: 47 %
HGB BLD-MCNC: 17 G/DL
LYMPHOCYTES # BLD AUTO: 3.1 K/UL
LYMPHOCYTES NFR BLD: 26.7 %
MAGNESIUM SERPL-MCNC: 1.9 MG/DL
MCH RBC QN AUTO: 30.3 PG
MCHC RBC AUTO-ENTMCNC: 36.2 %
MCV RBC AUTO: 84 FL
MONOCYTES # BLD AUTO: 1 K/UL
MONOCYTES NFR BLD: 8.6 %
NEUTROPHILS # BLD AUTO: 7.3 K/UL
NEUTROPHILS NFR BLD: 62.3 %
PHOSPHATE SERPL-MCNC: 3 MG/DL
PLATELET # BLD AUTO: 321 K/UL
PMV BLD AUTO: 10.7 FL
POTASSIUM SERPL-SCNC: 4 MMOL/L
PROT SERPL-MCNC: 7.7 G/DL
RBC # BLD AUTO: 5.61 M/UL
SODIUM SERPL-SCNC: 136 MMOL/L
WBC # BLD AUTO: 11.68 K/UL

## 2017-03-21 PROCEDURE — 25000003 PHARM REV CODE 250: Performed by: PHYSICIAN ASSISTANT

## 2017-03-21 PROCEDURE — 25000003 PHARM REV CODE 250: Performed by: STUDENT IN AN ORGANIZED HEALTH CARE EDUCATION/TRAINING PROGRAM

## 2017-03-21 PROCEDURE — 84100 ASSAY OF PHOSPHORUS: CPT

## 2017-03-21 PROCEDURE — 25000003 PHARM REV CODE 250: Performed by: PSYCHIATRY & NEUROLOGY

## 2017-03-21 PROCEDURE — 83735 ASSAY OF MAGNESIUM: CPT

## 2017-03-21 PROCEDURE — 36415 COLL VENOUS BLD VENIPUNCTURE: CPT

## 2017-03-21 PROCEDURE — 97116 GAIT TRAINING THERAPY: CPT

## 2017-03-21 PROCEDURE — 85025 COMPLETE CBC W/AUTO DIFF WBC: CPT

## 2017-03-21 PROCEDURE — 80053 COMPREHEN METABOLIC PANEL: CPT

## 2017-03-21 PROCEDURE — 11000001 HC ACUTE MED/SURG PRIVATE ROOM

## 2017-03-21 RX ADMIN — STANDARDIZED SENNA CONCENTRATE AND DOCUSATE SODIUM 1 TABLET: 8.6; 5 TABLET, FILM COATED ORAL at 09:03

## 2017-03-21 RX ADMIN — ACETAMINOPHEN 650 MG: 500 TABLET ORAL at 12:03

## 2017-03-21 RX ADMIN — LEVETIRACETAM 500 MG: 500 TABLET, FILM COATED ORAL at 09:03

## 2017-03-21 RX ADMIN — OXYCODONE HYDROCHLORIDE 5 MG: 5 TABLET ORAL at 04:03

## 2017-03-21 RX ADMIN — Medication 3 ML: at 11:03

## 2017-03-21 RX ADMIN — LEVETIRACETAM 500 MG: 500 TABLET, FILM COATED ORAL at 08:03

## 2017-03-21 RX ADMIN — BACITRACIN: 500 OINTMENT TOPICAL at 09:03

## 2017-03-21 RX ADMIN — Medication 3 ML: at 05:03

## 2017-03-21 RX ADMIN — HEPARIN SODIUM 5000 UNITS: 5000 INJECTION, SOLUTION INTRAVENOUS; SUBCUTANEOUS at 01:03

## 2017-03-21 RX ADMIN — HYDROCORTISONE 10 MG: 10 TABLET ORAL at 08:03

## 2017-03-21 RX ADMIN — Medication 3 ML: at 01:03

## 2017-03-21 RX ADMIN — POTASSIUM CHLORIDE 40 MEQ: 1500 TABLET, EXTENDED RELEASE ORAL at 09:03

## 2017-03-21 RX ADMIN — BACITRACIN: 500 OINTMENT TOPICAL at 08:03

## 2017-03-21 RX ADMIN — OXYCODONE HYDROCHLORIDE 5 MG: 5 TABLET ORAL at 08:03

## 2017-03-21 RX ADMIN — ACETAMINOPHEN 650 MG: 500 TABLET ORAL at 09:03

## 2017-03-21 RX ADMIN — HEPARIN SODIUM 5000 UNITS: 5000 INJECTION, SOLUTION INTRAVENOUS; SUBCUTANEOUS at 11:03

## 2017-03-21 RX ADMIN — HEPARIN SODIUM 5000 UNITS: 5000 INJECTION, SOLUTION INTRAVENOUS; SUBCUTANEOUS at 05:03

## 2017-03-21 NOTE — PROGRESS NOTES
Stroke code called.  Upon arrival, NSGY already at bedside - NSGY had been called prior to stroke code activation.  Per primary team, low suspicion for stroke, and stroke code cancelled.  L facial droop and LUE dysmetria on exam, but poor effort noted by patient.  Vision not tested due to patient reluctance and cancellation of stroke code, but L homonymous hemianopsia noted on prior exam by NCC today.      Recommend [facility] [place].    Jany Sarah MD  General Neurology Spectra Link: [id number]  [facility]  PGY-2    11:09 PM

## 2017-03-21 NOTE — PT/OT/SLP PROGRESS
Physical Therapy  Treatment/ Discharge    Roldan Moulton   MRN: [id number]   Admitting Diagnosis: Brain tumor    PT Received On: 03/21/17  PT Start Time: 1447     PT Stop Time: 1500    PT Total Time (min): 13 min       Billable Minutes:  Gait Training 13    Treatment Type: Treatment  PT/PTA: PT             General Precautions: Standard, fall  Orthopedic Precautions: N/A   Braces:      Do you have any cultural, spiritual, Taoism conflicts, given your current situation?: no conflicts    Subjective:    Patient agreed to participate in therapy session.     Objective:   Patient found with:  (bed alarm)  Patient found sleeping in bed, but woke easily.  He agreed to participate.  Therapist explained to patient the purpose of therapy session.  Therapist reviewed with patient compensatory strategies for visual field cuts. He ambulated in the [place] with multiple stationary and dynamic obstacles.  Therapist did not minimize distractions or clear pathways in order to assess patient's safety with gait.  Patient managed obstacles, stepped over objects, appropriately scanned the environment, and waited for other visitors to pass when necessary without cueing. Patient demonstrated decreased bhargavi, but used appropriate compensatory strategies to avoid obstacles.  No LOB or unsteadiness noted.     Functional Mobility:  Bed Mobility:   Rolling/Turning to Left: Supervision  Rolling/Turning Right: Supervision  Scooting/Bridging: Supervision  Supine to Sit: Supervision  Sit to Supine: Supervision    Transfers:  Sit <> Stand Assistance: Supervision    Gait:   Gait Distance: 400 feet with supervision and no assistive device.  Patient ambulated through crowded [place] with multiple stationary and moving obstacles and distractions with no LOB and head turns to compensate for vision cuts.     AM-PAC 6 CLICK MOBILITY  How much help from another person does this patient currently need?   1 = Unable, Total/Dependent Assistance  2 = A lot,  Maximum/Moderate Assistance  3 = A little, Minimum/Contact Guard/Supervision  4 = None, Modified [place]/Independent    Turning over in bed (including adjusting bedclothes, sheets and blankets)?: 3  Sitting down on and standing up from a chair with arms (e.g., wheelchair, bedside commode, etc.): 3  Moving from lying on back to sitting on the side of the bed?: 3  Moving to and from a bed to a chair (including a wheelchair)?: 3  Need to walk in hospital room?: 3  Climbing 3-5 steps with a railing?: 3  Total Score: 18    AM-PAC Raw Score CMS G-Code Modifier Level of Impairment Assistance   6 % Total / Unable   7 - 9 CM 80 - 100% Maximal Assist   10 - 14 CL 60 - 80% Moderate Assist   15 - 19 CK 40 - 60% Moderate Assist   20 - 22 CJ 20 - 40% Minimal Assist   23 CI 1-20% SBA / CGA   24 CH 0% Independent/ Mod I     Patient left supine with all lines intact, call button in reach and bed alarm on.    Assessment:  Roldan Moulton is a 41 y.o. male with a medical diagnosis of Brain tumor and presents with resolved functional mobility deficits.  Patient demonstrated safety with dynamic gait including avoiding obstacles, stepping over objects, and altering gait speed as necessary.  He demonstrated good compensatory strategies for visual field cuts without cuing.  Patient presents with no additional strength, coordination, endurance, or gait deficits at this time. He is no longer in need of skilled PT services at this time.  Please re-consult PT if functional status changes significantly.     Rehab identified problem list/impairments: Rehab identified problem list/impairments: decreased safety awareness    Rehab potential is good.    Activity tolerance: Excellent    Discharge recommendations: Discharge Facility/Level Of Care Needs: home     Barriers to discharge: Barriers to Discharge: None    Equipment recommendations: Equipment Needed After Discharge: none     GOALS:   Physical Therapy Goals     Not on file       Multidisciplinary Problems (Resolved)        Problem: Physical Therapy Goal    Goal Priority Disciplines Outcome Goal Variances Interventions   Physical Therapy Goal   (Resolved)     PT/OT, PT Outcome(s) achieved     Description:  Goals to be met by: 3/23/17     Patient will increase functional independence with mobility by performin. Supine to sit with Modified [place]- supervision level met  2. Sit to stand transfer with Modified [place]- supervision level met  3. Gait  x 150 feet with Supervision with or without appropriate AD. met  4. Lower extremity exercise program x30 reps per handout, with assistance as needed- met                 PLAN:    D/c from skilled PT services.   Plan of Care expires: 04/15/17  Plan of Care reviewed with: patient         Bren HELLER Dami, PT  2017

## 2017-03-21 NOTE — PLAN OF CARE
Problem: Patient Care Overview  Goal: Plan of Care Review  Outcome: Ongoing (interventions implemented as appropriate)  AAOX3 VSS. No falls noted, fall precautions remain. Skin intact. Pain assessed, complains of pain that is controlled with medication. Call light within reach. Will continue to monitor.

## 2017-03-21 NOTE — NURSING TRANSFER
Nursing Transfer Note      3/20/2017     Transferfrom 7079 to 510    Transfer via wheelchair    Transfer with Cardiac monitoring and O2    Transported by RN    Medicines sent: Desmopressin, bacitracin    Chart send with patient: Yes    Notified: spouse, Debbie    Patient reassessed at: 9030 3/20/17    Upon arrival to floor: patient oriented to room, call bell in reach and bed in lowest position

## 2017-03-21 NOTE — PROGRESS NOTES
Progress Note  Neurosurgery    Admit Date: 3/15/2017  Post-operative Day: 6 Days Post-Op  Hospital Day: 7    SUBJECTIVE:     Roldan Moulton is a 41 y.o. male s/p right frontotemporal crani for resection of sellar mass POD#6. Stepped down to floor yesterday. New L facial droop and L hemiparesis noted on exam yesterday, and stroke code was called. HCT done was stable. Poor PO intake.    Follow-up For:  Procedure(s) (LRB):  CRANIOTOMY WITH STEALTH, RIGHT FRONTOTEMPORAL CRANIOTOMY for tumor (Right)      Scheduled Meds:   bacitracin   Topical (Top) BID    heparin (porcine)  5,000 Units Subcutaneous Q8H    hydrocortisone  10 mg Oral QHS    levetiracetam  500 mg Oral BID    potassium chloride  40 mEq Oral Daily    senna-docusate 8.6-50 mg  1 tablet Oral Daily    sodium chloride 0.9%  3 mL Intravenous Q8H     Continuous Infusions:   PRN Meds:acetaminophen, artificial tears, ondansetron, oxycodone, oxycodone, sodium chloride 0.9%    Review of patient's allergies indicates:  No Known Allergies    OBJECTIVE:     Vital Signs (Most Recent)  Temp: 100.1 °F (37.8 °C) (03/21/17 0358)  Pulse: 80 (03/21/17 0358)  Resp: 16 (03/21/17 0358)  BP: 118/67 (03/21/17 0358)  SpO2: (!) 93 % (03/21/17 0358)    Vital Signs Range (Last 24H):  Temp:  [98.8 °F (37.1 °C)-101 °F (38.3 °C)]   Pulse:  [67-94]   Resp:  [12-25]   BP: (104-188)/(61-81)   SpO2:  [91 %-97 %]     I & O (Last 24H):  Intake/Output Summary (Last 24 hours) at 03/21/17 0846  Last data filed at 03/20/17 1901   Gross per 24 hour   Intake               50 ml   Output              750 ml   Net             -700 ml     Physical Exam:  General: well developed, well nourished, no distress.   Head: normocephalic, atraumatic  Neurologic: Alert and oriented. Thought content appropriate.  GCS: Motor: 6/Verbal: 5/Eyes: 4 GCS Total: 15  Mental Status: Awake, Alert, Oriented x 4   Language: No aphasia  Speech: No dysarthria  Cranial nerves: left facial droop, R eye periorbital edema with  resultant ptosos, left homonymous hemianopsia, CN II-XII otherwise intact.   Eyes: pupils equal, round, reactive to light with accomodation, EOMI.  Pulmonary: normal respirations, no signs of respiratory distress  Abdomen: soft, non-distended, not tender to palpation  Sensory: intact to light touch throughout    Motor Strength: Moves all extremities spontaneously with good tone. Follows commands in upper and lower extremities, although extremely effort dependent. No abnormal movements seen.     DTR's: 2 + and symmetric in UE and LE  Pronator Drift: no drift noted  Finger-to-nose: Intact bilaterally  Davis: absent  Clonus: absent  Babinski: absent  Pulses: 2+ and symmetric radial and dorsalis pedis.  Skin: Skin is warm, dry and intact.    Lines/Drains:       Peripheral IV - Single Lumen 03/19/17 1408 Right Antecubital (Active)   Site Assessment Clean;Dry;Intact 3/21/2017  1:00 AM   Line Status Flushed;Saline locked 3/21/2017  1:00 AM   Dressing Status Clean;Dry;Intact 3/21/2017  1:00 AM   Dressing Intervention Dressing reinforced 3/21/2017  1:00 AM   Dressing Change Due 03/23/17 3/21/2017  1:00 AM   Site Change Due 03/23/17 3/20/2017  7:01 PM   Reason Not Rotated Not due 3/21/2017  1:00 AM   Number of days:1            Peripheral IV - Single Lumen 03/19/17 2015 Left Wrist (Active)   Site Assessment Clean;Dry;Intact 3/21/2017  1:00 AM   Line Status Flushed;Saline locked 3/21/2017  1:00 AM   Dressing Status Clean;Dry;Intact 3/21/2017  1:00 AM   Dressing Intervention Dressing reinforced 3/21/2017  1:00 AM   Dressing Change Due 03/23/17 3/21/2017  1:00 AM   Site Change Due 03/23/17 3/20/2017  7:01 PM   Reason Not Rotated Not due 3/21/2017  1:00 AM   Number of days:1       Wound/Incision:  C/d/i, nonfluctuant edema surrounding incision site    Laboratory:  CBC:   Recent Labs  Lab 03/21/17  0420   WBC 11.68   RBC 5.61   HGB 17.0   HCT 47.0      MCV 84   MCH 30.3   MCHC 36.2*     BMP:   Recent Labs  Lab  03/21/17  0420   GLU 89      K 4.0      CO2 19*   BUN 11   CREATININE 0.9   CALCIUM 9.9   MG 1.9       Diagnostic Results:  CT head reviewed and agree with findings.  Postoperative changes are frontotemporal craniotomy for resection of sellar mass with slightly improved volume of hyperdense extra-axial fluid and pneumocephalus overlying the right frontal and temporal lobes with mild associated mass effect.    Worsening right frontotemporal soft tissue edema within the operative site with interval removal of subcutaneous drain.    ASSESSMENT/PLAN:     41 y.o. male s/p right frontotemporal crani for resection of sellar mass POD#6.    - Neuro stable.  - Continue SBP < 150.  - Continue hydrocortisone taper.  - Keppra for seizure ppx.  - DI: resolved. Consider ddAVP if UO greater than 250 cc/hr, spec grav less than 1.005 and Na above 145. UOP 650cc overnight; please document UOP Q2h and contact if above 300cc for two consecutive hours. Na 136.  - Pt refusing PT/OT and with decreased PO intake. Please contact neurosurgery if patient refuses meals, therapy. Last eval, therapy recommended home with no needs.  - Will CTM for 1-2 days, barring any complication.    Discussed with Dr. Kidd.      Yesica Juarez PA-C  Neurosurgery  Pager: [phone]

## 2017-03-21 NOTE — PLAN OF CARE
Problem: Pain, Acute (Adult)  Goal: Acceptable Pain Control/Comfort Level  Patient will demonstrate the desired outcomes by discharge/transition of care.   Outcome: Ongoing (interventions implemented as appropriate)    03/21/17 1640   Pain, Acute (Adult)   Acceptable Pain Control/Comfort Level making progress toward outcome   Frequent rounds to assess pain and needs for comfort. Safety precautions maintained. Encouraged food intake and liquids offered --Pt ate few bites of spaghetti for lunch, ate 1/2 cup of apple sauce and drank water and apple juice. Urine output monitored and recorded q 2hrs. Encouraged Pt to call for assistance as needed. Call bell in reach. Bed alarm on.

## 2017-03-21 NOTE — OP NOTE
DATE OF PROCEDURE:  03/15/2017.    ATTENDING PHYSICIAN:  Maximiliano Kidd M.D.    PREOPERATIVE DIAGNOSIS:  Pituitary adenoma.    POSTOPERATIVE DIAGNOSIS:  Pituitary adenoma.    PROCEDURES PERFORMED:  Right frontotemporal craniotomy, excision of pituitary   adenoma with neuronavigation and microsurgery.    SURGEON:  Maximiliano Kidd M.D.    ASSISTANT:  Alex Moe M.D. (RES) ([facility] Resident Neurosurgery).    ANESTHESIA:  General endotracheal.    ESTIMATED BLOOD LOSS:  200 mL.    DRAINS:  Hemovac subgaleal.    CONDITION AFTER PROCEDURE:  Satisfactory.    BRIEF HISTORY:  This 41-year-old man suffered an injury in April 2015 when he   was hit by a baseball and subsequent CT scan and MRI showed a lesion in the   sella turcica thought to be consistent with pituitary adenoma.  He underwent   transsphenoidal resection of the tumor by Dr. Madison on 06/24/2015.  He awoke   with blurring of vision in the right eye and followup study showed hemorrhage   into the sella and he was returned for subsequent re-evacuation.  He was left   with a left homonymous hemianopsia.  His hormonal status has been stable except   for low testosterone.  A more recent study showed continued presence of the   tumor, particularly to the right, elevating the optic chiasm and compressing the   right optic nerve.  It was felt that this would need to be addressed through a   craniotomy and he was admitted to the hospital.    PROCEDURE IN DETAIL:  The patient had a navigation MRI scan done.  He was   brought to the Operating Room in supine position under premedication, intubated   and induced with general anesthesia.  A Turcios catheter was inserted, sequential   compression devices applied to legs and various intravenous lines started.  A   cannula was placed in the left radial artery for continuous blood pressure   monitoring.  The head was somewhat elevated and turned to the left and   immobilized with the Bui 3-point skeletal fixation device.   The navigation   arc was attached to the [place] headrest and the head registered to the system   with surface recognition.  The right side of the scalp was shaved and this   portion of the scalp was prepped with Betadine and draped in a sterile fashion.    A curvilinear incision beginning at the zygoma and ending at the hairline was   outlined and this was injected with 1% Xylocaine and epinephrine.  The incision   was carried through the skin and galea, bleeding controlled in the skin margin   and skin flap with Mary clips.  The skin was elevated in the subgaleal space   down to the temporal line.  The temporalis fascia was cut from the zygoma along   the temporal line to the zygomatic process of the frontal bone and the fascia   elevated with the skin flap to protect the facial nerve and the skin flap   retracted with fishhook retractors.  The temporalis muscle was detached from the   temporal line with the Bovie cutting current, elevated with periosteal elevator   and retracted inferiorly with the fishhook retractors.  A pterional craniotomy   was outlined with the Bovie cutting current, cutting the pericranium.  [place]   holes were placed on the zygomatic process of the frontal bone and on the   temporal squama and the craniotomy cut with the high-speed drill, elevated and   removed from the operative field.  The sphenoid ridge was drilled down and some   bone taken off of the orbital roof to provide a more direct approach to the   sellar area.  Drill holes were made in the bone margin.  The dura tacked up to   these with 4-0 Nurolon sutures.  The Tobias retractor was affixed to the   [place] headrest, clean towels placed around the craniotomy opening, and the   operating microscope brought into the field.  The dura was opened with a   curvilinear incision and retracted inferiorly with 4-0 Nurolon sutures.  The   brain was somewhat full and the patient was given 50 g of mannitol.    Microdissection was  begun in the sylvian fissure,  the frontal and   temporal lobes and coming down to the middle cerebral artery.  The sylvian   fissure was then opened more proximally down to the carotid artery and optic   nerve.  The frontal lobe was gently retracted.  The arachnoid between the   olfactory and optic nerves was opened and the frontal lobe lifted up to expose   the sellar area.  A somewhat yellowish tumor was seen between the optic nerves   and underneath the right optic nerve coming over to the carotid artery.    Microdissection was begun along the inferior edge of the right optic nerve and a   specimen sent to the laboratory.  This was felt to be consistent with pituitary   adenoma.  The Sonopet ultrasonic aspirator was then used to decompress the   tumor and it was gradually dissected off the underside of the chiasm and beneath   the left optic nerve and underneath the right optic nerve using careful   microdissection and ultrasonic decompression.  Dissection was carried back to   Liliequist membrane.  This was partially opened.  The basilar artery and   branches could be seen.  The ocular motor nerve was not definitely identified.    The tumor could then be rolled forward toward the tuberculum sellae and   dissection continued beneath the chiasm and nerves down on to the sella.  The   pituitary stalk seemed to be pushed laterally to the left and the tumor was   dissected off of this and rolled beneath the carotid artery and then could be   brought out.  It seemed at the conclusion of the procedure that good resection   of this tumor had been accomplished; at least the optic chiasm and nerves were   well decompressed.  Small bleeding points were controlled with bipolar   coagulation and some Surgicel placed in the tumor bed.  The retractors were   removed and the wound irrigated.  The dura was closed with interrupted 4-0   Nurolon sutures, a piece of Duragen placed over this, and the bone flap returned    in place using the LocalBonus microplate system.  The temporalis muscle and fascia   were reapproximated with interrupted 3-0 Vicryl sutures.  A Hemovac drain was   placed in the subgaleal space and brought through a separate stab incision   posterior to the primary incision.  The galea was closed with inverted   interrupted 3-0 Vicryl sutures and the skin closed with skin staples.  A Telfa   bacitracin dressing was placed over the wound and held in place with tape.  The   patient's head was released from three-point skeletal fixation device.  He was   returned to full supine position, allowed to awaken from anesthesia, extubated,   and left the Operating Room in satisfactory condition.  He received 100 mg of   hydrocortisone, 2 g of Rocephin, and 500 mg of Dilantin at the beginning of the   procedure; 50 g of mannitol during the procedure.  Bacitracin-containing   antibiotic irrigating solutions were used throughout.      RDS/HN  dd: 03/21/2017 09:54:58 (CDT)  td: 03/21/2017 12:00:43 (CDT)  Doc ID   #[id number]  Job ID #[id number]    CC:

## 2017-03-21 NOTE — PLAN OF CARE
Problem: Physical Therapy Goal  Goal: Physical Therapy Goal  Goals to be met by: 3/23/17     Patient will increase functional independence with mobility by performin. Supine to sit with Modified [place]- supervision level met  2. Sit to stand transfer with Modified [place]- supervision level met  3. Gait x 150 feet with Supervision with or without appropriate AD. met  4. Lower extremity exercise program x30 reps per handout, with assistance as needed- met   Outcome: Outcome(s) achieved Date Met:  17  Patient is at a supervision level with all mobility 2* decreased safety awareness. He has otherwise met goals.

## 2017-03-22 LAB
ALBUMIN SERPL BCP-MCNC: 3.5 G/DL
ALP SERPL-CCNC: 140 U/L
ALT SERPL W/O P-5'-P-CCNC: 73 U/L
ANION GAP SERPL CALC-SCNC: 17 MMOL/L
AST SERPL-CCNC: 38 U/L
BASOPHILS # BLD AUTO: 0.05 K/UL
BASOPHILS NFR BLD: 0.4 %
BILIRUB SERPL-MCNC: 1.1 MG/DL
BUN SERPL-MCNC: 12 MG/DL
CALCIUM SERPL-MCNC: 9.6 MG/DL
CHLORIDE SERPL-SCNC: 100 MMOL/L
CO2 SERPL-SCNC: 19 MMOL/L
CREAT SERPL-MCNC: 0.9 MG/DL
DIFFERENTIAL METHOD: NORMAL
EOSINOPHIL # BLD AUTO: 0.3 K/UL
EOSINOPHIL NFR BLD: 2.3 %
ERYTHROCYTE [DISTWIDTH] IN BLOOD BY AUTOMATED COUNT: 12.8 %
EST. GFR  (AFRICAN AMERICAN): >60 ML/MIN/1.73 M^2
EST. GFR  (NON AFRICAN AMERICAN): >60 ML/MIN/1.73 M^2
GLUCOSE SERPL-MCNC: 88 MG/DL
HCT VFR BLD AUTO: 49.6 %
HGB BLD-MCNC: 17.5 G/DL
LYMPHOCYTES # BLD AUTO: 3 K/UL
LYMPHOCYTES NFR BLD: 25.3 %
MAGNESIUM SERPL-MCNC: 2.1 MG/DL
MAYO MISCELLANEOUS RESULT (REF): NORMAL
MCH RBC QN AUTO: 29.4 PG
MCHC RBC AUTO-ENTMCNC: 35.3 %
MCV RBC AUTO: 83 FL
MONOCYTES # BLD AUTO: 1 K/UL
MONOCYTES NFR BLD: 8.3 %
NEUTROPHILS # BLD AUTO: 7.6 K/UL
NEUTROPHILS NFR BLD: 63.3 %
PHOSPHATE SERPL-MCNC: 3.2 MG/DL
PLATELET # BLD AUTO: 315 K/UL
PMV BLD AUTO: 10.8 FL
POTASSIUM SERPL-SCNC: 4 MMOL/L
PROT SERPL-MCNC: 7.5 G/DL
RBC # BLD AUTO: 5.95 M/UL
SODIUM SERPL-SCNC: 136 MMOL/L
WBC # BLD AUTO: 11.98 K/UL

## 2017-03-22 PROCEDURE — 25000242 PHARM REV CODE 250 ALT 637 W/ HCPCS: Performed by: PHYSICIAN ASSISTANT

## 2017-03-22 PROCEDURE — 94761 N-INVAS EAR/PLS OXIMETRY MLT: CPT

## 2017-03-22 PROCEDURE — 36415 COLL VENOUS BLD VENIPUNCTURE: CPT

## 2017-03-22 PROCEDURE — 11000001 HC ACUTE MED/SURG PRIVATE ROOM

## 2017-03-22 PROCEDURE — 84100 ASSAY OF PHOSPHORUS: CPT

## 2017-03-22 PROCEDURE — 25000003 PHARM REV CODE 250: Performed by: STUDENT IN AN ORGANIZED HEALTH CARE EDUCATION/TRAINING PROGRAM

## 2017-03-22 PROCEDURE — 97532 *HC OT COG SKL DEV EA 15: CPT

## 2017-03-22 PROCEDURE — 25000003 PHARM REV CODE 250: Performed by: PSYCHIATRY & NEUROLOGY

## 2017-03-22 PROCEDURE — 94640 AIRWAY INHALATION TREATMENT: CPT

## 2017-03-22 PROCEDURE — 94799 UNLISTED PULMONARY SVC/PX: CPT

## 2017-03-22 PROCEDURE — 99900035 HC TECH TIME PER 15 MIN (STAT)

## 2017-03-22 PROCEDURE — 97535 SELF CARE MNGMENT TRAINING: CPT

## 2017-03-22 PROCEDURE — 99024 POSTOP FOLLOW-UP VISIT: CPT | Mod: ,,, | Performed by: PHYSICIAN ASSISTANT

## 2017-03-22 PROCEDURE — 94664 DEMO&/EVAL PT USE INHALER: CPT

## 2017-03-22 PROCEDURE — 25000003 PHARM REV CODE 250: Performed by: PHYSICIAN ASSISTANT

## 2017-03-22 PROCEDURE — 80053 COMPREHEN METABOLIC PANEL: CPT

## 2017-03-22 PROCEDURE — 83735 ASSAY OF MAGNESIUM: CPT

## 2017-03-22 PROCEDURE — 27000221 HC OXYGEN, UP TO 24 HOURS

## 2017-03-22 PROCEDURE — 85025 COMPLETE CBC W/AUTO DIFF WBC: CPT

## 2017-03-22 RX ORDER — IPRATROPIUM BROMIDE AND ALBUTEROL SULFATE 2.5; .5 MG/3ML; MG/3ML
3 SOLUTION RESPIRATORY (INHALATION)
Status: COMPLETED | OUTPATIENT
Start: 2017-03-22 | End: 2017-03-22

## 2017-03-22 RX ORDER — DOCUSATE SODIUM 100 MG/1
100 CAPSULE, LIQUID FILLED ORAL 2 TIMES DAILY
Status: DISCONTINUED | OUTPATIENT
Start: 2017-03-22 | End: 2017-03-24 | Stop reason: HOSPADM

## 2017-03-22 RX ADMIN — Medication 3 ML: at 05:03

## 2017-03-22 RX ADMIN — STANDARDIZED SENNA CONCENTRATE AND DOCUSATE SODIUM 1 TABLET: 8.6; 5 TABLET, FILM COATED ORAL at 10:03

## 2017-03-22 RX ADMIN — OXYCODONE HYDROCHLORIDE 5 MG: 5 TABLET ORAL at 05:03

## 2017-03-22 RX ADMIN — IPRATROPIUM BROMIDE AND ALBUTEROL SULFATE 3 ML: .5; 3 SOLUTION RESPIRATORY (INHALATION) at 01:03

## 2017-03-22 RX ADMIN — ACETAMINOPHEN 650 MG: 500 TABLET ORAL at 03:03

## 2017-03-22 RX ADMIN — HEPARIN SODIUM 5000 UNITS: 5000 INJECTION, SOLUTION INTRAVENOUS; SUBCUTANEOUS at 03:03

## 2017-03-22 RX ADMIN — BACITRACIN: 500 OINTMENT TOPICAL at 10:03

## 2017-03-22 RX ADMIN — HYDROCORTISONE 10 MG: 10 TABLET ORAL at 10:03

## 2017-03-22 RX ADMIN — LEVETIRACETAM 500 MG: 500 TABLET, FILM COATED ORAL at 10:03

## 2017-03-22 RX ADMIN — DOCUSATE SODIUM 100 MG: 100 CAPSULE, LIQUID FILLED ORAL at 10:03

## 2017-03-22 RX ADMIN — HEPARIN SODIUM 5000 UNITS: 5000 INJECTION, SOLUTION INTRAVENOUS; SUBCUTANEOUS at 10:03

## 2017-03-22 RX ADMIN — HEPARIN SODIUM 5000 UNITS: 5000 INJECTION, SOLUTION INTRAVENOUS; SUBCUTANEOUS at 05:03

## 2017-03-22 RX ADMIN — IPRATROPIUM BROMIDE AND ALBUTEROL SULFATE 3 ML: .5; 3 SOLUTION RESPIRATORY (INHALATION) at 08:03

## 2017-03-22 RX ADMIN — Medication 3 ML: at 03:03

## 2017-03-22 RX ADMIN — Medication 3 ML: at 10:03

## 2017-03-22 RX ADMIN — POTASSIUM CHLORIDE 40 MEQ: 1500 TABLET, EXTENDED RELEASE ORAL at 10:03

## 2017-03-22 NOTE — PROGRESS NOTES
Progress Note  Neurosurgery    Admit Date: 3/15/2017  Post-operative Day: 7 Days Post-Op  Hospital Day: 8    SUBJECTIVE:     Roldan Moulton is a 41 y.o. male s/p right frontotemporal crani for resection of sellar mass POD#7. NAEON. No family at bedside. Patient continues to remain in bed all day and has poor PO intake. Denies any headache, dizziness, N/V, vision changes, incontinence or any new symptoms. Voiding appropriately.        Scheduled Meds:   albuterol-ipratropium 2.5mg-0.5mg/3mL  3 mL Nebulization Q6H WAKE    bacitracin   Topical (Top) BID    heparin (porcine)  5,000 Units Subcutaneous Q8H    hydrocortisone  10 mg Oral QHS    potassium chloride  40 mEq Oral Daily    senna-docusate 8.6-50 mg  1 tablet Oral Daily    sodium chloride 0.9%  3 mL Intravenous Q8H     Continuous Infusions:   PRN Meds:acetaminophen, artificial tears, ondansetron, oxycodone, oxycodone, sodium chloride 0.9%    Review of patient's allergies indicates:  No Known Allergies    OBJECTIVE:     Vital Signs (Most Recent)  Temp: 99.8 °F (37.7 °C) (03/22/17 1226)  Pulse: 97 (03/22/17 1300)  Resp: 20 (03/22/17 1300)  BP: 102/65 (03/22/17 1226)  SpO2: 98 % (03/22/17 1300)    Vital Signs Range (Last 24H):  Temp:  [97.3 °F (36.3 °C)-100.1 °F (37.8 °C)]   Pulse:  []   Resp:  [14-22]   BP: ()/(60-75)   SpO2:  [93 %-99 %]     I & O (Last 24H):  Intake/Output Summary (Last 24 hours) at 03/22/17 1348  Last data filed at 03/22/17 1200   Gross per 24 hour   Intake              570 ml   Output             1250 ml   Net             -680 ml     Physical Exam:  General: well developed, well nourished, uninterested in his surroundings or participating in his exam.   Head: normocephalic, right temporal and periorbital edema and ecchymosis.   Neurologic: Alert and oriented. Thought content appropriate.  GCS: Motor: 6/Verbal: 4/Eyes: 4 GCS Total: 14  Mental Status: Awake, Alert, Oriented x 4. Very delayed responses to orientation questions.    Language: No aphasia  Speech: No dysarthria  Cranial nerves: left facial droop most evident when smiling or speaking, R eye periorbital edema with resultant ptosos, left homonymous hemianopsia, CN II-XII otherwise intact.   Eyes: pupils equal, round, reactive to light with accomodation, EOMI.  Pulmonary: normal respirations, no signs of respiratory distress  Abdomen: soft, non-distended, not tender to palpation  Sensory: intact to light touch throughout  Motor Strength: Moves all extremities spontaneously with good tone. Follows commands in upper and lower extremities, although extremely effort dependent. BUE/BLE 5-/5. No focal weakness. No abnormal movements seen.   Pronator Drift: no drift noted  Finger-to-nose: Intact bilaterally  Davis: absent  Clonus: absent  Babinski: absent  No LE edema  Skin: Skin is warm, dry and intact.      Lines/Drains:       Peripheral IV - Single Lumen 03/19/17 1408 Right Antecubital (Active)   Site Assessment Clean;Dry;Intact 3/21/2017  8:00 PM   Line Status Flushed;Saline locked 3/21/2017  8:00 PM   Dressing Status Clean;Dry;Intact 3/21/2017  8:00 PM   Dressing Intervention Dressing reinforced 3/21/2017  8:00 PM   Dressing Change Due 03/23/17 3/21/2017  8:00 PM   Site Change Due 03/23/17 3/20/2017  7:01 PM   Reason Not Rotated Not due 3/21/2017  8:00 PM   Number of days:2            Peripheral IV - Single Lumen 03/19/17 2015 Left Wrist (Active)   Site Assessment Clean;Dry;Intact 3/21/2017  8:00 PM   Line Status Flushed;Saline locked 3/21/2017  8:00 PM   Dressing Status Clean;Dry;Intact 3/21/2017  8:00 PM   Dressing Intervention Dressing reinforced 3/21/2017  8:00 PM   Dressing Change Due 03/23/17 3/21/2017  8:00 PM   Site Change Due 03/23/17 3/20/2017  7:01 PM   Reason Not Rotated Not due 3/21/2017  8:00 PM   Number of days:2       Wound/Incision:  clean, dry, intact, no drainage    Laboratory:  CBC:   Recent Labs  Lab 03/22/17  0456   WBC 11.98   RBC 5.95   HGB 17.5   HCT 49.6       MCV 83   MCH 29.4   MCHC 35.3     BMP:   Recent Labs  Lab 03/22/17  0456   GLU 88      K 4.0      CO2 19*   BUN 12   CREATININE 0.9   CALCIUM 9.6   MG 2.1         ASSESSMENT/PLAN:     Assessment:  Roldan Moulton is a 41 y.o. male s/p right frontotemporal crani for resection of sellar mass POD#7.    Plan:   -Patient neurologically stable on exam  -Exam remains greatly dependent on effort  -PO intake remains poor. Calorie count ordered. Spoke with nurse. Instructed her to page NSGY if patient is refusing meals. Patient informed that if PO intake does not improve, will not be able to discharge. He voiced understanding. Nurses reports patient maintaining hydration. No IVF's indicated at this time.   -PT/OT recommending outpatient OT. Also recommending ST cognition eval due to lack of safety awareness. Will FU recs.   -Continue Cortef 10 mg q HS  -Continue Keppra for seizure prophylaxis  -Tmax 100.1. No leukocytosis. Likely 2/2 atelectasis and patient remaining in bed. IS and acapella ordered. At bedside.   -DI: resolved. UOP stable. Na 136. Consider ddAVP if UO greater than 250 cc/hr, spec grav less than 1.005 and Na above 145.     DISPO: Home with outpatient therapy pending increased PO intake    Discussed with Dr. Kidd  Please call with any questions    Zulma Mauricio PA-C   Neurosurgery   Pager: [phone]

## 2017-03-22 NOTE — PLAN OF CARE
Problem: Occupational Therapy Goal  Goal: Occupational Therapy Goal  Goals to be met by: 3/29 (updated 3/22)    Patient will increase functional independence with ADLs by performing:    Grooming while standing at sink with Modified [place].  Toileting from toilet with Modified [place] for hygiene and clothing management.   Toilet transfer to toilet with Modified [place].  Pt will complete item location on L with 3/3 accuracy.   Pt will complete safety task with 0 added cues.  Pt will complete sequencing for ADL task with set up and complete in timely manner.   Outcome: Ongoing (interventions implemented as appropriate)  Goals updated. GIDEON Robbins  3/22/2017
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
## 2017-03-22 NOTE — PLAN OF CARE
Problem: Patient Care Overview  Goal: Plan of Care Review  Outcome: Ongoing (interventions implemented as appropriate)  AAOX3 VSS. No falls noted, fall precautions remain.  Patient encouraged to eat and drink. Pain assessed, complains of pain that is controlled with medication. Call light within reach. Will continue to monitor.

## 2017-03-23 ENCOUNTER — TELEPHONE (OUTPATIENT)
Dept: NEUROSURGERY | Facility: CLINIC | Age: 42
End: 2017-03-23

## 2017-03-23 DIAGNOSIS — G06.0 BRAIN ABSCESS: Primary | ICD-10-CM

## 2017-03-23 LAB
ANION GAP SERPL CALC-SCNC: 15 MMOL/L
BASOPHILS # BLD AUTO: 0.03 K/UL
BASOPHILS NFR BLD: 0.3 %
BUN SERPL-MCNC: 12 MG/DL
CALCIUM SERPL-MCNC: 9.3 MG/DL
CHLORIDE SERPL-SCNC: 99 MMOL/L
CO2 SERPL-SCNC: 20 MMOL/L
CREAT SERPL-MCNC: 0.9 MG/DL
DIFFERENTIAL METHOD: NORMAL
EOSINOPHIL # BLD AUTO: 0.2 K/UL
EOSINOPHIL NFR BLD: 2.2 %
ERYTHROCYTE [DISTWIDTH] IN BLOOD BY AUTOMATED COUNT: 13.1 %
EST. GFR  (AFRICAN AMERICAN): >60 ML/MIN/1.73 M^2
EST. GFR  (NON AFRICAN AMERICAN): >60 ML/MIN/1.73 M^2
GLUCOSE SERPL-MCNC: 94 MG/DL
HCT VFR BLD AUTO: 46 %
HGB BLD-MCNC: 16.2 G/DL
LYMPHOCYTES # BLD AUTO: 2.5 K/UL
LYMPHOCYTES NFR BLD: 24.9 %
MCH RBC QN AUTO: 29.8 PG
MCHC RBC AUTO-ENTMCNC: 35.2 %
MCV RBC AUTO: 85 FL
MONOCYTES # BLD AUTO: 0.6 K/UL
MONOCYTES NFR BLD: 6.2 %
NEUTROPHILS # BLD AUTO: 6.5 K/UL
NEUTROPHILS NFR BLD: 66.3 %
PLATELET # BLD AUTO: 312 K/UL
PMV BLD AUTO: 11 FL
POTASSIUM SERPL-SCNC: 3.9 MMOL/L
RBC # BLD AUTO: 5.44 M/UL
SODIUM SERPL-SCNC: 134 MMOL/L
WBC # BLD AUTO: 9.87 K/UL

## 2017-03-23 PROCEDURE — 25000003 PHARM REV CODE 250: Performed by: STUDENT IN AN ORGANIZED HEALTH CARE EDUCATION/TRAINING PROGRAM

## 2017-03-23 PROCEDURE — 85025 COMPLETE CBC W/AUTO DIFF WBC: CPT

## 2017-03-23 PROCEDURE — 36415 COLL VENOUS BLD VENIPUNCTURE: CPT

## 2017-03-23 PROCEDURE — 94799 UNLISTED PULMONARY SVC/PX: CPT

## 2017-03-23 PROCEDURE — 11000001 HC ACUTE MED/SURG PRIVATE ROOM

## 2017-03-23 PROCEDURE — 25000003 PHARM REV CODE 250: Performed by: PHYSICIAN ASSISTANT

## 2017-03-23 PROCEDURE — 92523 SPEECH SOUND LANG COMPREHEN: CPT

## 2017-03-23 PROCEDURE — 80048 BASIC METABOLIC PNL TOTAL CA: CPT

## 2017-03-23 PROCEDURE — 25000003 PHARM REV CODE 250: Performed by: PSYCHIATRY & NEUROLOGY

## 2017-03-23 RX ADMIN — ACETAMINOPHEN 650 MG: 500 TABLET ORAL at 11:03

## 2017-03-23 RX ADMIN — HEPARIN SODIUM 5000 UNITS: 5000 INJECTION, SOLUTION INTRAVENOUS; SUBCUTANEOUS at 02:03

## 2017-03-23 RX ADMIN — OXYCODONE HYDROCHLORIDE 15 MG: 5 TABLET ORAL at 08:03

## 2017-03-23 RX ADMIN — Medication 3 ML: at 06:03

## 2017-03-23 RX ADMIN — Medication 3 ML: at 02:03

## 2017-03-23 RX ADMIN — HEPARIN SODIUM 5000 UNITS: 5000 INJECTION, SOLUTION INTRAVENOUS; SUBCUTANEOUS at 05:03

## 2017-03-23 RX ADMIN — BACITRACIN: 500 OINTMENT TOPICAL at 10:03

## 2017-03-23 RX ADMIN — BACITRACIN: 500 OINTMENT TOPICAL at 08:03

## 2017-03-23 RX ADMIN — DOCUSATE SODIUM 100 MG: 100 CAPSULE, LIQUID FILLED ORAL at 09:03

## 2017-03-23 RX ADMIN — Medication 3 ML: at 09:03

## 2017-03-23 RX ADMIN — HEPARIN SODIUM 5000 UNITS: 5000 INJECTION, SOLUTION INTRAVENOUS; SUBCUTANEOUS at 09:03

## 2017-03-23 RX ADMIN — HYDROCORTISONE 10 MG: 10 TABLET ORAL at 08:03

## 2017-03-23 RX ADMIN — DOCUSATE SODIUM 100 MG: 100 CAPSULE, LIQUID FILLED ORAL at 08:03

## 2017-03-23 NOTE — PT/OT/SLP EVAL
"Speech Language Pathology Evaluation    Roldan Moulton   MRN: [id number]   Admitting Diagnosis: Brain tumor    Diet recommendations: Solid Diet Level: Regular  Liquid Diet Level: Thin Standard Aspiration precautions    SLP Treatment Date: 17  Speech Start Time: 1105     Speech Stop Time: 1142     Speech Total (min): 37 min       TREATMENT BILLABLE MINUTES:  Eval 37     Diagnosis: Brain tumor      History reviewed. No pertinent past medical history.  Past Surgical History:   Procedure Laterality Date    BRAIN SURGERY      HERNIA REPAIR  2006    NASAL SINUS SURGERY         Has the patient been evaluated by SLP for swallowing? : No  Keep patient NPO?: No   General Precautions: Standard, fall          Social Hx: Patient lives with his wife.    Occupational/hobbies/homemaking: Pt is a [employer].  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    Subjective:  "I want to get back to some level of normal.  I want to make Chief."  Pt stated  Patient goals: improved function.    Pain Ratin/10        Location: head  Pain Addressed: Reposition, Distraction  Pain Rating Post-Intervention: 9/10    Objective:   Patient found with: peripheral IV    Oral Musculature Evaluation  Oral Musculature: facial asymmetry present, left weakness  Dentition: present and adequate  Secretion Management: left corner drooling  Mucosal Quality: adequate  Mandibular Strength and Mobility: WFL  Oral Labial Strength and Mobility: impaired retraction, impaired pursing, impaired seal  Lingual Strength and Mobility: WFL  Buccal Strength and Mobility: impaired, decreased tone     Cognitive Status:  Behavioral Observations: alert, appropriate, cooperative, distractible and w/ initiation problems-  Memory and Orientation: Pt was oriented to person, place and situation indep.  He was oriented to time given min cues.  He completed delayed recall tasks w/ 100% acc indep.  He completed immediate recall tasks w/ 60% acc for recall of unrelated words.  Attention: " Moderately impaired for tasks and details.  Problem Solving: Moderately impaired for daily solutions, reasoning and processing speed.  Pragmatics: flat affect, poor eye contact, turn taking deficits, initiation problems and topic maintenance problems  Executive Function: initiation, insight/awareness and mental flexibility    Language: no    Auditory Comprehension: WFL, answers yes/no questions and able to follow commands    Verbal Expression: WFL, naming, automatic speech, repetition, fluency and conversational speech    Motor Speech: Mild dysarthria of speech w/ decreased inteligibility seen d/t decreased articulatory precision and rate    Voice: WFl    Augmentative Alternative Communication: no    Reading: WFL    Writing: WFL    Visual-Spatial: WFL     Additional Treatment:  Education was provided to pt re: SLP role, eval results and poc.  He indicated fair understanding and agreed w/ established poc.    FIM:  Social Interaction: 5 Supervision--The patient requires supervision (e.g., monitoring, verbal control, cueing, or coaxing) only under stressful or unfamiliar conditions, but less than 10% of the time.  The patient may require encouragement to initiate participation.   Problem Solving: 3 Moderate Direction--The patient solves routine problems 50 to 74% of the time.   Comprehension: 6 Modified [place]--In most situations, the patient understands readily or with only mild dificulty complex or abstract directions and conversation.  The patient does not require prompting, though (s)he may require a hearing or visual aid, other x   Expression: 4 Minimal Prompting--The patient expresses basic daily needs and ideas 75 to 90% of the time.   Memory: 3 Moderate Prompting--The patient recognizes and remembers 50 to 74% of the time.     Assessment:  Roldan Moulton is a 41 y.o. male with a SLP diagnosis of Dysarthria and Cognitive-Linguistic Impairment.    Do you have any cultural, spiritual, Pentecostalism conflicts,  given your current situation?: none    Discharge recommendations: Discharge Facility/Level Of Care Needs: outpatient speech therapy     Goals:   SLP Goals        Problem: SLP Goal    Goal Priority Disciplines Outcome   SLP Goal     SLP Ongoing (interventions implemented as appropriate)   Description:  Speech Language Pathology Goals  Goals expected to be met by 3/30    1.  Pt will complete immediate recall tasks w/ 80% acc given min cues.  2.  Pt will complete mental manipulation tasks w/ 80% acc given min cues.  3.  Pt will complete reasoning tasks w/ 80% acc given min cues.  4.  Pt will complete problem-solving tasks w/ 80% acc given min cues.  5.  Pt will recall 10 items from a category in 60 sec timed trials x2/3 trials.  6.  Pt will complete further evaluation of math, time and money management skills to determine the need for additional goals.  7.  Pt will complete oral motor ex's x10 reps given min cues.                   Plan:   Patient to be seen Therapy Frequency: 5 x/week   Plan of Care expires: 04/21/17  Plan of Care reviewed with: patient  SLP Follow-up?: Yes              Mony Davis CCC-SLP  03/23/2017

## 2017-03-23 NOTE — TELEPHONE ENCOUNTER
AZAM BRANCH AND AZAM PTS WIFE. HE IS TO BE DC TOMORROW, Friday. ADDED A CTH ON TO HIS PO APPT FOR NEXT Thursday.

## 2017-03-23 NOTE — TELEPHONE ENCOUNTER
----- Message from Nakia Larios sent at 3/21/2017 10:48 AM CDT -----  Contact: maria luz (wife) @ [phone]  pts wife has some questions about pts surgery.  pls call.

## 2017-03-23 NOTE — PLAN OF CARE
Problem: SLP Goal  Goal: SLP Goal  Speech Language Pathology Goals  Goals expected to be met by 3/30    1. Pt will complete immediate recall tasks w/ 80% acc given min cues.  2. Pt will complete mental manipulation tasks w/ 80% acc given min cues.  3. Pt will complete reasoning tasks w/ 80% acc given min cues.  4. Pt will complete problem-solving tasks w/ 80% acc given min cues.  5. Pt will recall 10 items from a category in 60 sec timed trials x2/3 trials.  6. Pt will complete further evaluation of math, time and money management skills to determine the need for additional goals.  7. Pt will complete oral motor exs x10 reps given min cues.  Outcome: Ongoing (interventions implemented as appropriate)  Speech-Language Cognitive evaluation completed.  Recs reviewed w/ team.  Cont ST per POC.     Mony Davis, MAYRA-SLP  3/23/2017

## 2017-03-23 NOTE — PROGRESS NOTES
Progress Note  Neurosurgery    Admit Date: 3/15/2017  Post-operative Day: 8 Days Post-Op  Hospital Day: 9    SUBJECTIVE:     Roldan Moulton is a 41 y.o. male s/p right frontotemporal crani for resection of sellar mass POD#8. Per nursing notes, pt's PO intake appears to have increased somewhat. Pt ambulated in room several times yesterday and sat up in chair. More interactive with me today. Denies HA, blurred vision, n/v.    Follow-up For:  Procedure(s) (LRB):  CRANIOTOMY WITH STEALTH, RIGHT FRONTOTEMPORAL CRANIOTOMY for tumor (Right)      Scheduled Meds:   bacitracin   Topical (Top) BID    docusate sodium  100 mg Oral BID    heparin (porcine)  5,000 Units Subcutaneous Q8H    hydrocortisone  10 mg Oral QHS    potassium chloride  40 mEq Oral Daily    sodium chloride 0.9%  3 mL Intravenous Q8H     Continuous Infusions:   PRN Meds:acetaminophen, artificial tears, ondansetron, oxycodone, oxycodone, sodium chloride 0.9%    Review of patient's allergies indicates:  No Known Allergies    OBJECTIVE:     Vital Signs (Most Recent)  Temp: 97.1 °F (36.2 °C) (03/23/17 0800)  Pulse: 85 (03/23/17 0800)  Resp: 18 (03/23/17 0800)  BP: (!) 101/56 (03/23/17 0800)  SpO2: (!) 93 % (03/23/17 0800)    Vital Signs Range (Last 24H):  Temp:  [97 °F (36.1 °C)-99.8 °F (37.7 °C)]   Pulse:  []   Resp:  [18-22]   BP: ()/(53-65)   SpO2:  [93 %-98 %]     I & O (Last 24H):  Intake/Output Summary (Last 24 hours) at 03/23/17 0855  Last data filed at 03/23/17 0600   Gross per 24 hour   Intake              960 ml   Output             1450 ml   Net             -490 ml     Physical Exam:  General: well developed, well nourished, uninterested in his surroundings or participating in his exam.   Head: normocephalic, right temporal and periorbital edema and ecchymosis.   Neurologic: Alert and oriented. Thought content appropriate.  GCS: Motor: 6/Verbal: 4/Eyes: 4 GCS Total: 14  Mental Status: Awake, Alert, Oriented x 4.   Language: No  aphasia  Speech: No dysarthria  Cranial nerves: left facial droop most evident when smiling or speaking, R eye periorbital edema with resultant ptosos, left homonymous hemianopsia, CN II-XII otherwise intact.   Eyes: pupils equal, round, reactive to light with accomodation, EOMI.  Pulmonary: normal respirations, no signs of respiratory distress  Abdomen: soft, non-distended, not tender to palpation  Sensory: intact to light touch throughout  Motor Strength: Moves all extremities spontaneously with good tone. Follows commands in upper and lower extremities, although extremely effort dependent. BUE/BLE 5-/5. No focal weakness. No abnormal movements seen.   Pronator Drift: no drift noted  Finger-to-nose: Intact bilaterally  Davis: absent  Clonus: absent  Babinski: absent  No LE edema  Skin: Skin is warm, dry and intact.    Lines/Drains:       Peripheral IV - Single Lumen 03/19/17 1408 Right Antecubital (Active)   Site Assessment Intact;Clean 3/22/2017  8:15 PM   Line Status Flushed;Saline locked 3/22/2017  8:15 PM   Dressing Status Clean;Dry;Intact 3/22/2017  8:15 PM   Dressing Intervention Dressing reinforced 3/21/2017  8:00 PM   Dressing Change Due 03/23/17 3/21/2017  8:00 PM   Site Change Due 03/23/17 3/20/2017  7:01 PM   Reason Not Rotated Not due 3/22/2017  8:15 PM   Number of days:3            Peripheral IV - Single Lumen 03/19/17 2015 Left Wrist (Active)   Site Assessment Clean;Dry;Intact 3/22/2017  8:15 PM   Line Status Flushed;Saline locked 3/22/2017  8:15 PM   Dressing Status Clean;Dry;Intact 3/22/2017  8:15 PM   Dressing Intervention Dressing reinforced 3/21/2017  8:00 PM   Dressing Change Due 03/23/17 3/21/2017  8:00 PM   Site Change Due 03/23/17 3/20/2017  7:01 PM   Reason Not Rotated Not due 3/22/2017  8:15 PM   Number of days:3       Wound/Incision:  clean, dry, intact, no drainage    Laboratory:  CBC:   Recent Labs  Lab 03/23/17  0528   WBC 9.87   RBC 5.44   HGB 16.2   HCT 46.0      MCV 85    MCH 29.8   MCHC 35.2     BMP:   Recent Labs  Lab 03/22/17  0456 03/23/17  0528   GLU 88 94    134*   K 4.0 3.9    99   CO2 19* 20*   BUN 12 12   CREATININE 0.9 0.9   CALCIUM 9.6 9.3   MG 2.1  --      ASSESSMENT/PLAN:     41 y.o. male s/p right frontotemporal crani for resection of sellar mass POD#8.    -Patient neurologically stable on exam  -Exam remains greatly dependent on effort  -PO intake remains poor. Calorie count ordered. Spoke with nurse. Instructed her to page NSGY if patient is refusing meals. Patient informed that if PO intake does not improve, will not be able to discharge. He voiced understanding. Nurses reports patient maintaining hydration. No IVF's indicated at this time.   -PT/OT recommending outpatient OT. Also recommending ST cognition eval due to lack of safety awareness. Will FU recs.   -Continue Cortef 10 mg q HS  -Continue Keppra for seizure prophylaxis  -Tmax 100.1. No leukocytosis. Likely 2/2 atelectasis and patient remaining in bed. IS and acapella ordered. At bedside.   -DI: resolved. UOP stable. Consider ddAVP if UO greater than 250 cc/hr, spec grav less than 1.005 and Na above 145.      DISPO: Home with outpatient OT, ST. F/u with Dr. Kidd 3/30 with a CT head.      Yesica Juarez PA-C  Neurosurgery  Pager: [phone]

## 2017-03-23 NOTE — PLAN OF CARE
Problem: Patient Care Overview  Goal: Plan of Care Review  Outcome: Ongoing (interventions implemented as appropriate)    03/22/17 1913   Coping/Psychosocial   Plan Of Care Reviewed With patient   Encouraged Pt to increase food intake, offered food preference--Pt ate 2/3 of mashed sweet potatoes and 1/2 of green beans portion today. Pt ambulated in room several times today and sat up in chair x 2hr--will need reinforcement to sit up in chair longer. Safety precautions maintained. Calorie count in use.

## 2017-03-24 VITALS
OXYGEN SATURATION: 97 % | DIASTOLIC BLOOD PRESSURE: 61 MMHG | BODY MASS INDEX: 28.15 KG/M2 | SYSTOLIC BLOOD PRESSURE: 103 MMHG | WEIGHT: 196.63 LBS | RESPIRATION RATE: 18 BRPM | HEART RATE: 74 BPM | TEMPERATURE: 99 F | HEIGHT: 70 IN

## 2017-03-24 LAB
ANION GAP SERPL CALC-SCNC: 12 MMOL/L
BASOPHILS # BLD AUTO: 0.05 K/UL
BASOPHILS NFR BLD: 0.6 %
BUN SERPL-MCNC: 10 MG/DL
CALCIUM SERPL-MCNC: 8.9 MG/DL
CHLORIDE SERPL-SCNC: 102 MMOL/L
CO2 SERPL-SCNC: 23 MMOL/L
CREAT SERPL-MCNC: 0.9 MG/DL
DIFFERENTIAL METHOD: NORMAL
EOSINOPHIL # BLD AUTO: 0.2 K/UL
EOSINOPHIL NFR BLD: 2.3 %
ERYTHROCYTE [DISTWIDTH] IN BLOOD BY AUTOMATED COUNT: 12.9 %
EST. GFR  (AFRICAN AMERICAN): >60 ML/MIN/1.73 M^2
EST. GFR  (NON AFRICAN AMERICAN): >60 ML/MIN/1.73 M^2
GLUCOSE SERPL-MCNC: 115 MG/DL
HCT VFR BLD AUTO: 42.7 %
HGB BLD-MCNC: 14.8 G/DL
LYMPHOCYTES # BLD AUTO: 2.1 K/UL
LYMPHOCYTES NFR BLD: 25.6 %
MCH RBC QN AUTO: 29.3 PG
MCHC RBC AUTO-ENTMCNC: 34.7 %
MCV RBC AUTO: 85 FL
MONOCYTES # BLD AUTO: 0.6 K/UL
MONOCYTES NFR BLD: 7.5 %
NEUTROPHILS # BLD AUTO: 5.3 K/UL
NEUTROPHILS NFR BLD: 63.8 %
PLATELET # BLD AUTO: 308 K/UL
PMV BLD AUTO: 10.9 FL
POTASSIUM SERPL-SCNC: 3.7 MMOL/L
RBC # BLD AUTO: 5.05 M/UL
SODIUM SERPL-SCNC: 137 MMOL/L
WBC # BLD AUTO: 8.24 K/UL

## 2017-03-24 PROCEDURE — 36415 COLL VENOUS BLD VENIPUNCTURE: CPT

## 2017-03-24 PROCEDURE — 99024 POSTOP FOLLOW-UP VISIT: CPT | Mod: ,,, | Performed by: PHYSICIAN ASSISTANT

## 2017-03-24 PROCEDURE — 25000003 PHARM REV CODE 250: Performed by: PHYSICIAN ASSISTANT

## 2017-03-24 PROCEDURE — 25000003 PHARM REV CODE 250: Performed by: STUDENT IN AN ORGANIZED HEALTH CARE EDUCATION/TRAINING PROGRAM

## 2017-03-24 PROCEDURE — 94664 DEMO&/EVAL PT USE INHALER: CPT

## 2017-03-24 PROCEDURE — 94761 N-INVAS EAR/PLS OXIMETRY MLT: CPT

## 2017-03-24 PROCEDURE — 25000003 PHARM REV CODE 250: Performed by: NEUROLOGICAL SURGERY

## 2017-03-24 PROCEDURE — 85025 COMPLETE CBC W/AUTO DIFF WBC: CPT

## 2017-03-24 PROCEDURE — 80048 BASIC METABOLIC PNL TOTAL CA: CPT

## 2017-03-24 RX ORDER — LEVETIRACETAM 500 MG/1
500 TABLET ORAL 2 TIMES DAILY
Status: DISCONTINUED | OUTPATIENT
Start: 2017-03-24 | End: 2017-03-24 | Stop reason: HOSPADM

## 2017-03-24 RX ORDER — HYDROCORTISONE 10 MG/1
10 TABLET ORAL NIGHTLY
Qty: 30 TABLET | Refills: 0 | Status: SHIPPED | OUTPATIENT
Start: 2017-03-24 | End: 2017-04-03

## 2017-03-24 RX ORDER — OXYCODONE HYDROCHLORIDE 5 MG/1
5 TABLET ORAL EVERY 6 HOURS PRN
Qty: 75 TABLET | Refills: 0 | Status: SHIPPED | OUTPATIENT
Start: 2017-03-24

## 2017-03-24 RX ORDER — BACITRACIN 500 [USP'U]/G
OINTMENT TOPICAL 2 TIMES DAILY
Refills: 0 | COMMUNITY
Start: 2017-03-24

## 2017-03-24 RX ORDER — LEVETIRACETAM 500 MG/1
500 TABLET ORAL 2 TIMES DAILY
Qty: 60 TABLET | Refills: 1 | Status: SHIPPED | OUTPATIENT
Start: 2017-03-24 | End: 2018-03-24

## 2017-03-24 RX ORDER — DOCUSATE SODIUM 100 MG/1
100 CAPSULE, LIQUID FILLED ORAL 2 TIMES DAILY
Refills: 0 | COMMUNITY
Start: 2017-03-24

## 2017-03-24 RX ADMIN — DOCUSATE SODIUM 100 MG: 100 CAPSULE, LIQUID FILLED ORAL at 10:03

## 2017-03-24 RX ADMIN — HEPARIN SODIUM 5000 UNITS: 5000 INJECTION, SOLUTION INTRAVENOUS; SUBCUTANEOUS at 05:03

## 2017-03-24 RX ADMIN — SODIUM CHLORIDE 1000 ML: 0.9 INJECTION, SOLUTION INTRAVENOUS at 02:03

## 2017-03-24 RX ADMIN — Medication 3 ML: at 05:03

## 2017-03-24 RX ADMIN — OXYCODONE HYDROCHLORIDE 15 MG: 5 TABLET ORAL at 02:03

## 2017-03-24 RX ADMIN — BACITRACIN: 500 OINTMENT TOPICAL at 10:03

## 2017-03-24 RX ADMIN — LEVETIRACETAM 500 MG: 500 TABLET, FILM COATED ORAL at 12:03

## 2017-03-24 NOTE — PLAN OF CARE
Future Appointments  Date Time Provider Department Center   3/30/2017 10:30 AM [facility] CT2 64- LIMIT 600 LBS [facility] CT SCAN [facility]   3/30/2017 11:30 AM Maximiliano Kidd MD [facility] NEUROS7 [facility]   4/11/2017 1:30 PM Morgan Tracy MD [facility] ENDO PI [facility]       CM visit with pt and friend at bedside who plans to provide transportation home.   CM provide PT/OT/ST otpt order form.       03/24/17 1129   Final Note   Assessment Type Final Discharge Note   Discharge Disposition Home  (OTPT PT/OT/ST)   Discharge planning education complete? Yes   What phone number can be called within the next 1-3 days to see how you are doing after discharge? [phone]   Hospital Follow Up  Appt(s) scheduled? Yes   Discharge plans and expectations educations in teach back method with documentation complete? Yes   Offered [facility] -- Bedside Delivery? n/a   Discharge/Hospital Encounter Summary to (non-[facility]) PCP n/a   Referral to Outpatient Case Management complete? n/a   Referral to / orders for Home Health Complete? n/a   30 day supply of medicines given at discharge, if documented non-compliance / non-adherence? n/a   Any social issues identified prior to discharge? Yes   Did you assess the readiness or willingness of the family or caregiver to support self management of care? Yes

## 2017-03-24 NOTE — PLAN OF CARE
Problem: Patient Care Overview  Goal: Plan of Care Review  Outcome: Ongoing (interventions implemented as appropriate)  PATIENT AAOX4. PATIENT VS ARE STABLE AND THE PATIENT IS AFEBRILE. PATIENT IS PROGRESSING THROUGH THE CARE PLAN. PATIENT SAFETY MEASURES ARE WITHIN PLACE; SIDE RAILS UP X2, BED SIDE TABLE WITHIN REACH, PATIENT BELONGINGS ARE WITHIN REACH, NATHAN LIGHT WITHIN REACH, NON SKID SOCKS APPLIED. WILL CONTINUE TO MONITOR.

## 2017-03-24 NOTE — DISCHARGE SUMMARY
[facility]  Discharge Summary  Neurosurgery    Admit Date: 3/15/2017    Discharge Date and Time: 3/24/2017 12:57 PM    Attending Physician: Maximiliano Kidd MD     Discharge Physician: Isaac Garcia PA-C    Reason for Admission:   Roldan Moulton is a 41-year-old man suffered an injury in April 2015 when he was hit by a baseball and subsequent CT scan and MRI showed a lesion in the sella turcica thought to be consistent with pituitary adenoma. He underwent transsphenoidal resection of the tumor by Dr. Madison on 06/24/2015. He awoke with blurring of vision in the right eye and followup study showed hemorrhage into the sella and he was returned for subsequent re-evacuation. He was left with a left homonymous hemianopsia. His hormonal status has been stable except for low testosterone. A more recent study showed continued presence of the tumor, particularly to the right, elevating the optic chiasm and compressing the right optic nerve. It was felt that this would need to be addressed through a craniotomy and he was admitted to the hospital.      Procedures Performed: Procedure(s) (LRB):  CRANIOTOMY WITH STEALTH, RIGHT FRONTOTEMPORAL CRANIOTOMY for tumor (Right)    Hospital Course (synopsis of major diagnoses, care, treatment, and services provided during the course of the hospital stay):     Roldan Moulton presented to [facility] on 3/15/17 for the above stated procedure. he tolerated the procedure well, and there were no intra-operative difficulties. He recovered and was monitored in Neuro ICU. Patient had mild DI symptoms post operatively so endocrinology was consulted. Mild DI after surgery that is resolved with one dose of DDAVP. Post operatively, patient did well and went to the floor on 3/20/17. On 3/20/17 at 11:00pm, a stroke code was called by nursing staff as patient had L facial droop; neurosurgery determined that there was a low suspicion for stroke as CTH was negative. Stroke code was cancelled.     he began working with therapy who was ultimately cleared to go outpatient OT and ST.  Pain was controlled on oral medications. Patient was able to void on his own.  Post operative imaging were stable. Patient was restarted on Keppra 500mg BID prior to discharge for left facial twitching/partial sz. On 03/24/2017, patient was discharged home with pain medication and follow up appointments. Regular diet. Activity as tolerated. At the time of discharge, vital signs were stable, patient was afebrile and neurologically stable.  he  was counseled on wound care, activity restrictions, and follow up prior to discharge.  Discharge instructions were given verbally/written to the patient and their family. All of their questions were answered. Patient and family voiced understanding. They were encouraged to call the clinic with any questions they might have prior to the follow up appointments.         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   -F/u with Dr. Kidd 3/30 with a CT head.      Medications:  Reconciled Home Medications:   Discharge Medication List as of 3/24/2017 12:05 PM      START taking these medications    Details   bacitracin 500 unit/gram ointment Apply topically 2 (two) times daily., Starting 3/24/2017, Until Discontinued, OTC      docusate sodium (COLACE) 100 MG capsule Take 1 capsule (100 mg total) by mouth 2 (two) times daily., Starting 3/24/2017, Until Discontinued, OTC       hydrocortisone (CORTEF) 10 MG Tab Take 1 tablet (10 mg total) by mouth every evening., Starting 3/24/2017, Until Mon 4/3/17, Print      levetiracetam (KEPPRA) 500 MG Tab Take 1 tablet (500 mg total) by mouth 2 (two) times daily., Starting 3/24/2017, Until Sat 3/24/18, Print      oxycodone (ROXICODONE) 5 MG immediate release tablet Take 1 tablet (5 mg total) by mouth every 6 (six) hours as needed., Starting 3/24/2017, Until Discontinued, Print         CONTINUE these medications which have NOT CHANGED    Details   testosterone cypionate (DEPOTESTOTERONE CYPIONATE) 100 mg/mL injection take 1 milliliter intramuscularly every week, Historical Med           No discharge procedures on file.

## 2017-03-24 NOTE — DISCHARGE INSTRUCTIONS
Please follow ONLY the instructions that are checked below.    Activity Restrictions:  [x]  Return to work will be determined on an individual basis.  [x]  No lifting greater than 10 pounds.  [x]  No driving or operating machinery:  [x]  until cleared by your surgeon.  [x]  while taking narcotic pain medications or muscle relaxants.  [x]  Increase ambulation over the next 2 weeks so that you are walking 2 miles per day at 2 weeks post-operatively.  [x]  Walk on paved surfaces only. It is okay to walk up and down stairs while holding onto a side rail.  []  No sexual activity for 2-3 weeks.    Discharge Medication/Follow-up:  [x]  Please refer to discharge medication reconciliation form.  [x]  Do not take ANY non-steroidal anti-inflammatory drugs (NSAIDS), including the following: ibuprofen, naprosyn, Aleve, Advil, Indocin, Mobic, or Celebrex for:  []  4 weeks  [x]  8 weeks  []  6 months  [x]  Prescriptions for appropriate medication will be given upon discharge.   [x]  Pain control: oxycodone          []  Other:             [x]  Take docusate (Colace 100 mg): take one capsule a day as needed for constipation. You can get this over the counter.  [x]  Follow-up appointment:  []  10-14 days post-op for wound check by physician assistant/nurse  []  On 3/30 with Dr. Kidd:  [x]  with CT / MRI  []  without CT / MRI  [x]  An appointment will be mailed to you.    Wound Care:  []  Remove dressing or bandaid in    days.  [x]  No bandage required. Keep your incision open to the air.  [x]  You may shower on the 2nd day after your surgery. Have the force of water hit you opposite from the incision. Pat the incision dry after your shower; do not scrub the incision.  [x]  You cannot take a bath until 8 weeks after surgery.  [x]  Apply bacitracin to incision twice a day for 10 more days.    Call your doctor or go to the Emergency Room for any signs of infection, including: increased redness, drainage, pain, or fever (temperature  ?101.5 for 24 hours). Call your doctor or go to the Emergency Room if there are any localized neurological changes; problems with speech, vision, numbness, tingling, weakness, or severe headache; or for other concerns.    Special Instructions:  []  No use of tobacco products.  []  Diet: Please eat a regular diet as tolerated.  []  Other diet:              Specific physician instructions: Continue cortef 10mg every night      Physicians need 3 days' notice for pain medicine to be refilled. Pain medicine will only be refilled between 8 AM and 5 PM, Monday through Friday, due to Food and Drug Administration regulation of documentation.    If you have any questions about this form, please call [phone].    Form No. 27510 (Revised 10/31/2013)

## 2017-03-24 NOTE — PROGRESS NOTES
"Progress Note  Neurosurgery    Admit Date: 3/15/2017  Post-operative Day: 9 Days Post-Op  Hospital Day: 10    SUBJECTIVE:     Roldan Moulton is a 41 y.o. male s/p right frontotemporal crani for resection of sellar mass POD#9. Patient was sitting upright in bed and very interactive on exam. He reports that he developed several episodes (2-3 mins each) of "left facial spasms." He recorded the episodes. Other than that, he denies any headaches, focal deficits, or gait issues. He reports of stable left peripheral blurry vision since trans-nasal surgery. Tolerating diet and voiding appropriately. Plans for DC home today.         Follow-up For:  Procedure(s) (LRB):  CRANIOTOMY WITH STEALTH, RIGHT FRONTOTEMPORAL CRANIOTOMY for tumor (Right)      Scheduled Meds:   bacitracin   Topical (Top) BID    docusate sodium  100 mg Oral BID    heparin (porcine)  5,000 Units Subcutaneous Q8H    hydrocortisone  10 mg Oral QHS    sodium chloride 0.9%  3 mL Intravenous Q8H     Continuous Infusions:   PRN Meds:acetaminophen, artificial tears, ondansetron, oxycodone, oxycodone, sodium chloride 0.9%    Review of patient's allergies indicates:  No Known Allergies    OBJECTIVE:     Vital Signs (Most Recent)  Temp: 98.8 °F (37.1 °C) (03/24/17 1214)  Pulse: 74 (03/24/17 1214)  Resp: 18 (03/24/17 1214)  BP: 103/61 (03/24/17 1214)  SpO2: 97 % (03/24/17 1214)    Vital Signs Range (Last 24H):  Temp:  [97.6 °F (36.4 °C)-98.6 °F (37 °C)]   Pulse:  [80-88]   Resp:  [14-18]   BP: ()/(49-66)   SpO2:  [94 %-98 %]     I & O (Last 24H):  Intake/Output Summary (Last 24 hours) at 03/24/17 0852  Last data filed at 03/24/17 0600   Gross per 24 hour   Intake              600 ml   Output             1300 ml   Net             -700 ml     Physical Exam:  General: well developed, well nourished, flat affect  Head: normocephalic, right temporal and periorbital edema and ecchymosis.   Neurologic: Awake, Alert, Oriented x 4. Thought content appropriate.  GCS: " Motor: 6/Verbal: 4/Eyes: 4 GCS Total: 14  Language: No aphasia  Speech: No dysarthria  Cranial nerves: left facial droop most evident when smiling or speaking, R eye periorbital edema with resultant ptosis (improving), left homonymous hemianopsia, CN II-XII otherwise intact.   Eyes: pupils equal, round, reactive to light with accomodation, EOMI.  Pulmonary: normal respirations, no signs of respiratory distress  Abdomen: soft, non-distended, not tender to palpation  Sensory: intact to light touch throughout  Motor Strength: Moves all extremities spontaneously with good tone and 5/5 strength. Follows commands in upper and lower extremities. No focal weakness. No abnormal movements seen.   Pronator Drift: no drift noted  Finger-to-nose: Intact bilaterally  Davis: absent  Clonus: absent  Babinski: absent  No LE edema  Skin: Skin is warm, dry and intact.    Lines/Drains:       Peripheral IV - Single Lumen 03/19/17 1408 Right Antecubital (Active)   Site Assessment Clean;Dry;Intact;No redness;No swelling 3/23/2017  8:00 PM   Line Status Saline locked 3/23/2017  8:00 PM   Dressing Status Clean;Dry;Intact 3/23/2017  8:00 AM   Dressing Intervention Dressing reinforced 3/23/2017  8:00 AM   Dressing Change Due 03/23/17 3/21/2017  8:00 PM   Site Change Due 03/23/17 3/20/2017  7:01 PM   Reason Not Rotated Not due 3/23/2017  8:00 AM   Number of days:4            Peripheral IV - Single Lumen 03/19/17 2015 Left Wrist (Active)   Site Assessment Clean;Dry;Intact;No redness;No swelling 3/23/2017  8:00 PM   Line Status Flushed;Saline locked 3/23/2017  8:00 AM   Dressing Status Clean;Dry;Intact 3/23/2017  8:00 AM   Dressing Intervention Dressing reinforced 3/23/2017  8:00 AM   Dressing Change Due 03/23/17 3/21/2017  8:00 PM   Site Change Due 03/23/17 3/20/2017  7:01 PM   Reason Not Rotated Not due 3/23/2017  8:00 AM   Number of days:4       Wound/Incision:  clean, dry, intact, no drainage    Laboratory:  CBC:   Recent Labs  Lab  03/24/17 0426   WBC 8.24   RBC 5.05   HGB 14.8   HCT 42.7      MCV 85   MCH 29.3   MCHC 34.7     BMP:   Recent Labs  Lab 03/22/17 0456  03/24/17 0426   GLU 88  < > 115*     < > 137   K 4.0  < > 3.7     < > 102   CO2 19*  < > 23   BUN 12  < > 10   CREATININE 0.9  < > 0.9   CALCIUM 9.6  < > 8.9   MG 2.1  --   --    < > = values in this interval not displayed.      Diagnostic Results:  None new    ASSESSMENT/PLAN:     41 y.o. male s/p right frontotemporal crani for resection of sellar mass POD#9.     -Patient neurologically stable on exam. Patient had some facial twitching on my exam, likely partial sz since last night.   -PO intake monitored. Adequate PO hydration.  -PT/OT recommending outpatient OT. Also recommending ST cognition eval due to lack of safety awareness.   -Continue Cortef 10 mg q HS  -Restarted Keppra 500mg BID for seizure prophylaxis  -Tmax 100.1. No leukocytosis. Likely 2/2 atelectasis and patient remaining in bed. IS and acapella ordered. At bedside.   -DI: resolved. UOP stable. Consider ddAVP if UO greater than 250 cc/hr, spec grav less than 1.005 and Na above 145.       DISPO: Home today with outpatient OT, ST. F/u with Dr. Kidd 3/30 with a CT head.    Discussed with Dr. Kidd    Please call with any questions.    Isaac Garcia PA-C  Neurosurgery  Pager: [phone]

## 2017-03-24 NOTE — NURSING
Pt discharged home, accompanied by friend. Sharri. Vss. Rt crani incision with staples clean, intact. Rx and D/c instruction given verbal and written--voiced understanding. Ambulatory referral papers given to Patientt by [facility]

## 2017-03-25 NOTE — PLAN OF CARE
Problem: Occupational Therapy Goal  Goal: Occupational Therapy Goal  Goals to be met by: 3/29 (updated 3/22)    Patient will increase functional independence with ADLs by performing:    Grooming while standing at sink with Modified [place].  Toileting from toilet with Modified [place] for hygiene and clothing management.   Toilet transfer to toilet with Modified [place].  Pt will complete item location on L with 3/3 accuracy.   Pt will complete safety task with 0 added cues.  Pt will complete sequencing for ADL task with set up and complete in timely manner.   Outcome: Outcome(s) achieved Date Met:  03/25/17  Pt d/c at this time. Goals not met. GIDEON Robbins 3/25/2017

## 2017-03-25 NOTE — PT/OT/SLP DISCHARGE
Occupational Therapy Discharge Summary    Roldan Moulton  MRN: [id number]   Brain tumor   Patient Discharged from acute Occupational Therapy on 3/24/2017.  Please refer to prior OT note dated on 3/22/2017 for functional status.     Assessment:   Patient was discharge unexpectedly.  Information required to complete and accurate discharge summary is unknown.  Refer to therapy initial evaluation and last progress note for initial and most recent functional status and goal achievement.  Recommendations made may be found in medical record.  GOALS:   Occupational Therapy Goals     Not on file      Multidisciplinary Problems (Resolved)        Problem: Occupational Therapy Goal    Goal Priority Disciplines Outcome Interventions   Occupational Therapy Goal   (Resolved)     OT, PT/OT Outcome(s) achieved    Description:  Goals to be met by: 3/29 (updated 3/22)    Patient will increase functional independence with ADLs by performing:    Grooming while standing at sink with Modified [place].  Toileting from toilet with Modified [place] for hygiene and clothing management.   Toilet transfer to toilet with Modified [place].  Pt will complete item location on L with 3/3 accuracy.   Pt will complete safety task with 0 added cues.  Pt will complete sequencing for ADL task with set up and complete in timely manner.               Reasons for Discontinuation of Therapy Services  Transfer to alternate level of care.      Plan:  Patient Discharged to: Home with spouse; Outpatient Occupational Therapy Services.    GIDEON Robbins 3/25/2017

## 2017-03-30 ENCOUNTER — HOSPITAL ENCOUNTER (OUTPATIENT)
Dept: RADIOLOGY | Facility: HOSPITAL | Age: 42
Discharge: HOME | End: 2017-03-30
Attending: NEUROLOGICAL SURGERY
Payer: OTHER GOVERNMENT

## 2017-03-30 ENCOUNTER — OFFICE VISIT (OUTPATIENT)
Dept: NEUROSURGERY | Facility: CLINIC | Age: 42
End: 2017-03-30
Payer: OTHER GOVERNMENT

## 2017-03-30 VITALS
HEART RATE: 63 BPM | TEMPERATURE: 98 F | HEIGHT: 70 IN | BODY MASS INDEX: 25.77 KG/M2 | DIASTOLIC BLOOD PRESSURE: 75 MMHG | SYSTOLIC BLOOD PRESSURE: 98 MMHG | WEIGHT: 180 LBS

## 2017-03-30 DIAGNOSIS — G06.0 BRAIN ABSCESS: ICD-10-CM

## 2017-03-30 DIAGNOSIS — D35.2 PITUITARY ADENOMA: ICD-10-CM

## 2017-03-30 PROCEDURE — 99024 POSTOP FOLLOW-UP VISIT: CPT | Mod: ,,, | Performed by: NEUROLOGICAL SURGERY

## 2017-03-30 PROCEDURE — 99999 PR PBB SHADOW E&M-EST. PATIENT-LVL III: CPT | Mod: PBBFAC,,, | Performed by: NEUROLOGICAL SURGERY

## 2017-03-30 PROCEDURE — 70450 CT HEAD/BRAIN W/O DYE: CPT | Mod: 26,,, | Performed by: RADIOLOGY

## 2017-03-30 PROCEDURE — 99213 OFFICE O/P EST LOW 20 MIN: CPT | Mod: PBBFAC | Performed by: NEUROLOGICAL SURGERY

## 2017-03-30 RX ORDER — BUTALBITAL, ACETAMINOPHEN AND CAFFEINE 50; 325; 40 MG/1; MG/1; MG/1
1 TABLET ORAL EVERY 4 HOURS PRN
Qty: 60 TABLET | Refills: 1 | Status: SHIPPED | OUTPATIENT
Start: 2017-03-30

## 2017-03-30 NOTE — LETTER
March 30, 2017      Melania Gil MD  [address] [facility] [place] [address]           [facility] - Neurosurgery 7th Fl  [address]  [facility] [address]  Phone: [phone]          Patient: Roldan Moulton   MR Number: [id number]   YOB: 1975   Date of Visit: 3/30/2017       Dear Dr. Melania Gil:    Thank you for referring Roldan Moulton to me for evaluation. Attached you will find relevant portions of my assessment and plan of care.    If you have questions, please do not hesitate to call me. I look forward to following Roldan Moulton along with you.    Sincerely,    Maximiliano Kidd MD    Enclosure  CC:  No Recipients    If you would like to receive this communication electronically, please contact [email or web address] or [phone] to request more information on [employer] Link access.    For providers and/or their staff who would like to refer a patient to [facility], please contact us through our one-stop-shop provider referral line, [facility] , at [phone].    If you feel you have received this communication in error or would no longer like to receive these types of communications, please e-mail [email or web address]

## 2017-03-30 NOTE — PROGRESS NOTES
This office note has been dictated.  March 30, 2017    Jose Madison M.D.  [facility] [id number]  [place]  [address]    RE:  ROLDAN NEWTON  [facility] No.:  [id number]    Dear Roldan Garcia returned in neurosurgical followup to the office today.  As you   know, he was admitted to [facility] and underwent right frontotemporal   craniotomy and resection of the residual pituitary adenoma on 03/15/17.  It   seemed possible to get a good resection.  The area was fairly heavily scarred.    The tumor was taken off of the chiasm and the underside of the right optic nerve   and carotid artery.  Path report was immunoreactive for ACTH.  He was   discharged to home and returns today for staple removal.  He has had some   continued headache and feels that his appetite is not as good and that things   taste somewhat salty to him.    On brief examination today, he is awake and cooperative.  His incision is   healing well and the staples were removed.  He shows good extraocular movements.    He has the preexisting left hemianopsia.  Vision on the right eye seems   stable.  Speech is clear.  He complains of some tinnitus.  Gait is generally   intact, although he subjectively feels a little off balance.    CT scan of the brain was repeated at [facility] today.  He had a small   epidural hematoma postop and this is resolving, although not completely gone.    The area of the sella shows no complicating findings.  The optic chiasm and   pituitary stalk are identified.  It will take MRI to see if there is any   residual tumor.    He is not ready to return to work and I will plan to keep him out until we have   his MRI study done in about two months.  I will have an endocrine profile drawn   today and call him with the results.  He was prescribed Fioricet for headache.    Thank you again for the opportunity to participate in his care.    Sincerely yours,      ALICE/GAIL  dd: 03/30/2017 12:24:01 (CDT)  td: 03/31/2017  02:19:22 (CDT)  Doc ID   #[id number]  Job ID #[id number]    CC: Roldan Moulton

## 2017-03-30 NOTE — MR AVS SNAPSHOT
[facility] - Neurosurgery 7th Fl  [address]  [facility] [address]  Phone: [phone]                  Roldan Moulton   3/30/2017 11:30 AM   Office Visit    Description:  Male : 1975   Provider:  Maximiliano Kidd MD   Department:  [facility] - [address]           Reason for Visit     Post-op Evaluation           Diagnoses this Visit        Comments    Pituitary adenoma                To Do List           Future Appointments        Provider Department Dept Phone    2017 1:30 PM Morgan Tracy MD [facility] - Endocrinology [phone]    2017 2:00 PM Maximiliano Kidd MD [facility] Neurosurgery [facility] [phone]      Goals (5 Years of Data)     None      Follow-Up and Disposition     Return in about 3 months (around 2017) for MRI brain.       These Medications        Disp Refills Start End    butalbital-acetaminophen-caffeine -40 mg (FIORICET, ESGIC) -40 mg per tablet 60 tablet 1 3/30/2017     Take 1 tablet by mouth every 4 (four) hours as needed for Pain or Headaches. - Oral    Pharmacy: [facility] [address] #: [phone]         [facility]     [facility] Nurse Care Line -  Assistance  Unless otherwise directed by your provider, please contact [facility], our nurse care line that is available for  assistance.     Registered nurses in the [facility] provide: appointment scheduling, clinical advisement, health education, and other advisory services.  Call: [phone] (toll free)               Medications           Message regarding Medications     Verify the changes and/or additions to your medication regime listed below are the same as discussed with your clinician today.  If any of these changes or additions are incorrect, please notify your healthcare provider.        START taking these NEW medications        Refills    butalbital-acetaminophen-caffeine -40 mg (FIORICET,  "ESGIC) -40 mg per tablet 1    Sig: Take 1 tablet by mouth every 4 (four) hours as needed for Pain or Headaches.    Class: Normal    Route: Oral           Verify that the below list of medications is an accurate representation of the medications you are currently taking.  If none reported, the list may be blank. If incorrect, please contact your healthcare provider. Carry this list with you in case of emergency.           Current Medications     bacitracin 500 unit/gram ointment Apply topically 2 (two) times daily.    docusate sodium (COLACE) 100 MG capsule Take 1 capsule (100 mg total) by mouth 2 (two) times daily.    hydrocortisone (CORTEF) 10 MG Tab Take 1 tablet (10 mg total) by mouth every evening.    levetiracetam (KEPPRA) 500 MG Tab Take 1 tablet (500 mg total) by mouth 2 (two) times daily.    oxycodone (ROXICODONE) 5 MG immediate release tablet Take 1 tablet (5 mg total) by mouth every 6 (six) hours as needed.    testosterone cypionate (DEPOTESTOTERONE CYPIONATE) 100 mg/mL injection take 1 milliliter intramuscularly every week    butalbital-acetaminophen-caffeine -40 mg (FIORICET, ESGIC) -40 mg per tablet Take 1 tablet by mouth every 4 (four) hours as needed for Pain or Headaches.           Clinical Reference Information           Your Vitals Were     BP Pulse Temp Height Weight BMI    98/75 63 98 °F (36.7 °C) (Oral) 5' 10" (1.778 m) 81.6 kg (180 lb) 25.83 kg/m2      Blood Pressure          Most Recent Value    BP  98/75      Allergies as of 3/30/2017     No Known Allergies      Immunizations Administered on Date of Encounter - 3/30/2017     None      Orders Placed During Today's Visit     Future Labs/Procedures Expected by Expires    ACTH  3/30/2017 5/29/2018    Cortisol, 8AM  3/30/2017 5/29/2018    Follicle stimulating hormone  3/30/2017 5/29/2018    Insulin-like growth factor  3/30/2017 5/29/2018    Prolactin  3/30/2017 5/29/2018    T4, free  3/30/2017 5/29/2018    Testosterone  3/30/2017 " 5/29/2018    TSH  3/30/2017 5/29/2018    MRI Brain W WO Contrast  6/5/2017 3/30/2018      [facility] Sign-Up     Activating your [facility] account is as easy as 1-2-3!     1) Visit [email or web address], select Sign Up Now, enter this activation code and your date of birth, then select Next.  [email or web address]  Expires: 5/8/2017 12:05 PM      2) Create a username and password to use when you visit [facility] in the future and select a security question in case you lose your password and select Next.    3) Enter your e-mail address and click Sign Up!    Additional Information  If you have questions, please e-mail [email or web address] or call [phone] to talk to our [facility] staff. Remember, [facility] is NOT to be used for urgent needs. For medical emergencies, dial 911.         Language Assistance Services     ATTENTION: Language assistance services are available, free of charge. Please call [phone].      ATENCIÓN: Si habla español, tiene a ortez disposición servicios gratuitos de asistencia lingüística. Llame al [phone].     PANCHITO Ý: N?u b?n nói Ti?ng Vi?t, có các d?ch v? h? tr? ngôn ng? mi?n phí dành cho b?n. G?i s? [phone].         [address] complies with applicable Federal civil rights laws and does not discriminate on the basis of race, color, national origin, age, disability, or sex.

## 2017-04-06 ENCOUNTER — TELEPHONE (OUTPATIENT)
Dept: NEUROSURGERY | Facility: CLINIC | Age: 42
End: 2017-04-06

## 2017-04-06 NOTE — TELEPHONE ENCOUNTER
----- Message from Nora Nieto sent at 4/6/2017  1:08 PM CDT -----  Contact: self: [phone]  Pt called and would like to speak to doctor in reference to a letter that he received.    Pt can be reached at [phone]    Thank you

## 2017-04-06 NOTE — TELEPHONE ENCOUNTER
AZAM PT AND DISCUSSED PITUITARY LABS & DISCONTINUING THE HYDROCORTISONE AS OF TODAY. HE UNDERSTOOD. SEES DR GARCIA IN ENDOCRINOLOGY AND WILL DISCUSS TESTOSTERONE INJECTIONS WITH HIM AT THAT TIME.

## 2017-04-07 ENCOUNTER — TELEPHONE (OUTPATIENT)
Dept: NEUROSURGERY | Facility: CLINIC | Age: 42
End: 2017-04-07

## 2017-04-07 NOTE — TELEPHONE ENCOUNTER
Advised patient that appointment time has changed to 1130. Patient agreed to appointment date, time and location.

## 2017-04-11 ENCOUNTER — OFFICE VISIT (OUTPATIENT)
Dept: ENDOCRINOLOGY | Facility: CLINIC | Age: 42
End: 2017-04-11
Payer: OTHER GOVERNMENT

## 2017-04-11 ENCOUNTER — LAB VISIT (OUTPATIENT)
Dept: LAB | Facility: HOSPITAL | Age: 42
End: 2017-04-11
Attending: INTERNAL MEDICINE
Payer: OTHER GOVERNMENT

## 2017-04-11 VITALS
WEIGHT: 183.19 LBS | SYSTOLIC BLOOD PRESSURE: 123 MMHG | BODY MASS INDEX: 26.22 KG/M2 | HEIGHT: 70 IN | HEART RATE: 75 BPM | DIASTOLIC BLOOD PRESSURE: 73 MMHG

## 2017-04-11 DIAGNOSIS — D35.2 PITUITARY ADENOMA: Primary | ICD-10-CM

## 2017-04-11 DIAGNOSIS — E29.1 SECONDARY MALE HYPOGONADISM: ICD-10-CM

## 2017-04-11 DIAGNOSIS — D35.2: ICD-10-CM

## 2017-04-11 DIAGNOSIS — D35.2 PITUITARY ADENOMA: ICD-10-CM

## 2017-04-11 LAB
ANION GAP SERPL CALC-SCNC: 8 MMOL/L
BUN SERPL-MCNC: 5 MG/DL
CALCIUM SERPL-MCNC: 9 MG/DL
CHLORIDE SERPL-SCNC: 104 MMOL/L
CO2 SERPL-SCNC: 28 MMOL/L
CORTIS SERPL-MCNC: 5.1 UG/DL
CREAT SERPL-MCNC: 0.9 MG/DL
DHEA-S SERPL-MCNC: 36.5 UG/DL
EST. GFR  (AFRICAN AMERICAN): >60 ML/MIN/1.73 M^2
EST. GFR  (NON AFRICAN AMERICAN): >60 ML/MIN/1.73 M^2
GLUCOSE SERPL-MCNC: 131 MG/DL
POTASSIUM SERPL-SCNC: 3.9 MMOL/L
SODIUM SERPL-SCNC: 140 MMOL/L

## 2017-04-11 PROCEDURE — 36415 COLL VENOUS BLD VENIPUNCTURE: CPT

## 2017-04-11 PROCEDURE — 82024 ASSAY OF ACTH: CPT

## 2017-04-11 PROCEDURE — 99213 OFFICE O/P EST LOW 20 MIN: CPT | Mod: PBBFAC | Performed by: INTERNAL MEDICINE

## 2017-04-11 PROCEDURE — 80048 BASIC METABOLIC PNL TOTAL CA: CPT

## 2017-04-11 PROCEDURE — 99214 OFFICE O/P EST MOD 30 MIN: CPT | Mod: S$PBB,,, | Performed by: INTERNAL MEDICINE

## 2017-04-11 PROCEDURE — 99999 PR PBB SHADOW E&M-EST. PATIENT-LVL III: CPT | Mod: PBBFAC,,, | Performed by: INTERNAL MEDICINE

## 2017-04-11 PROCEDURE — 82533 TOTAL CORTISOL: CPT

## 2017-04-11 PROCEDURE — 82627 DEHYDROEPIANDROSTERONE: CPT

## 2017-04-11 NOTE — MR AVS SNAPSHOT
[facility]  [address] [facility] [address]  Phone: [phone]  Fax: [phone]                  Roldan Moulton   2017 11:30 AM   Office Visit    Description:  Male : 1975   Provider:  Morgan Tracy MD   Department:  [facility]           Reason for Visit     Consult           Diagnoses this Visit        Comments    Pituitary adenoma    -  Primary     Secondary male hypogonadism         Corticotroph adenoma                To Do List           Future Appointments        Provider Department Dept Phone    2017 1:30 PM LAB, HEMONC SAME DAY [facility] [phone]    2017 1:45 PM LAB, HEMONC SAME DAY [facility] [phone]    2017 12:45 PM [facility] MRI1 [facility] [phone]    2017 2:00 PM Maximiliano Kidd MD [facility] - Neurosurgery [facility] [phone]      Goals (5 Years of Data)     None      [facility]     [facility] Nurse Care Line - 24 Assistance  Unless otherwise directed by your provider, please contact [facility], our nurse care line that is available for  assistance.     Registered nurses in the [facility] provide: appointment scheduling, clinical advisement, health education, and other advisory services.  Call: [phone] (toll free)               Medications           Message regarding Medications     Verify the changes and/or additions to your medication regime listed below are the same as discussed with your clinician today.  If any of these changes or additions are incorrect, please notify your healthcare provider.             Verify that the below list of medications is an accurate representation of the medications you are currently taking.  If none reported, the list may be blank. If incorrect, please contact your healthcare provider. Carry this list with you in case of emergency.           Current  "Medications     bacitracin 500 unit/gram ointment Apply topically 2 (two) times daily.    butalbital-acetaminophen-caffeine -40 mg (FIORICET, ESGIC) -40 mg per tablet Take 1 tablet by mouth every 4 (four) hours as needed for Pain or Headaches.    docusate sodium (COLACE) 100 MG capsule Take 1 capsule (100 mg total) by mouth 2 (two) times daily.    levetiracetam (KEPPRA) 500 MG Tab Take 1 tablet (500 mg total) by mouth 2 (two) times daily.    oxycodone (ROXICODONE) 5 MG immediate release tablet Take 1 tablet (5 mg total) by mouth every 6 (six) hours as needed.    testosterone cypionate (DEPOTESTOTERONE CYPIONATE) 100 mg/mL injection take 1 milliliter intramuscularly every week           Clinical Reference Information           Your Vitals Were     BP Pulse Height Weight BMI    123/73 75 5' 10" (1.778 m) 83.1 kg (183 lb 3.2 oz) 26.29 kg/m2      Blood Pressure          Most Recent Value    BP  123/73      Allergies as of 4/11/2017     No Known Allergies      Immunizations Administered on Date of Encounter - 4/11/2017     None      Orders Placed During Today's Visit      Normal Orders This Visit    Ambulatory Referral to Urology     Future Labs/Procedures Expected by Expires    ACTH  4/11/2017 6/10/2018    Basic metabolic panel  4/11/2017 6/10/2018    Cortisol  4/11/2017 6/10/2018    DHEA-sulfate  4/11/2017 6/10/2018    Urinalysis  4/11/2017 6/10/2018      Instructions    # Corticotroph adenoma s/p TSS x 2 and craniotomy x 1     - We need to get AM cortisol and ACTH but due to limitations with his travel and limited access to labs we will get random cortisol and ACTH today with DHEAS   - get AM cortisol and ACTH ASAP as external ans send me results     # hypogonadotrophic hypogonadism   - non compliant with testosterone injections   - we discussed different options of testosterone replacement   - he would like to explore options of testosterone pellets   Urology referral     # check IGF -1 today     # check " BMP and UA today     # fatigue multifactorial   Vitamin D          Language Assistance Services     ATTENTION: Language assistance services are available, free of charge. Please call [phone].      ATENCIÓN: Si habstacia rbuio, tiene a ortez disposición servicios gratuitos de asistencia lingüística. Llame al [phone].     CHÚ Ý: N?u b?n nói Ti?ng Vi?t, có các d?ch v? h? tr? ngôn ng? mi?n phí dành cho b?n. G?i s? [phone].         [facility] complies with applicable Federal civil rights laws and does not discriminate on the basis of race, color, national origin, age, disability, or sex.

## 2017-04-11 NOTE — LETTER
April 13, 2017      Maximiliano Kidd MD  [address]  [facility] [address]           [facility] End[facility]in[facility]  [address], 3rd Floor [facility] [address]  Phone: [phone]  Fax: [phone]          Patient: Roldan Moulton   MR Number: [id number]   YOB: 1975   Date of Visit: 4/11/2017       Dear Dr. Maximiliano Kidd:    Thank you for referring Roldan Moulton to me for evaluation. Attached you will find relevant portions of my assessment and plan of care.    If you have questions, please do not hesitate to call me. I look forward to following Roldan Moulton along with you.    Sincerely,    Morgan Tracy MD    Enclosure  CC:  No Recipients    If you would like to receive this communication electronically, please contact [email or web address] or [phone] to request more information on [employer] Link access.    For providers and/or their staff who would like to refer a patient to [facility], please contact us through our one-stop-shop provider referral line, [facility], at [phone].    If you feel you have received this communication in error or would no longer like to receive these types of communications, please e-mail [email or web address]

## 2017-04-11 NOTE — PATIENT INSTRUCTIONS
# Corticotroph adenoma s/p TSS x 2 and craniotomy x 1     - We need to get AM cortisol and ACTH but due to limitations with his travel and limited access to labs we will get random cortisol and ACTH today with DHEAS   - get AM cortisol and ACTH ASAP as external ans send me results     # hypogonadotrophic hypogonadism   - non compliant with testosterone injections   - we discussed different options of testosterone replacement   - he would like to explore options of testosterone pellets   Urology referral     # check IGF -1 today     # check BMP and UA today     # fatigue multifactorial   Vitamin D

## 2017-04-11 NOTE — PROGRESS NOTES
Subjective:      Patient ID: Ita Newton is a 41 y.o. male.    Chief Complaint:  Consult      History of Present Illness      Mr.Jason Newton is referred to me from      He had 2 TSS at outside hospital     First diagnosed with pituitary adenoma incidentally when had imaging testing in 2015     Per him unknown path and unknown diagnosis     Patient is on testosterone replacement     Was on steroids for short period of time at the time of first surgery      Never felt normal since first surgery     PATH : [facility] corticotroph adenoma         Review of Systems   Constitutional: Positive for fatigue.   HENT: Negative for trouble swallowing and voice change.    Eyes: Negative for visual disturbance.   Respiratory: Negative for cough.    Gastrointestinal: Negative for constipation and diarrhea.   Musculoskeletal: Positive for myalgias.   Skin: Negative for rash.   Neurological: Negative for tremors.   Psychiatric/Behavioral: Positive for sleep disturbance. The patient is nervous/anxious.        Objective:   Physical Exam   Constitutional: He appears well-developed.   HENT:   Right Ear: External ear normal.   Left Ear: External ear normal.   Nose: Nose normal.   Hearing normal    Neck: No tracheal deviation present. No thyromegaly present.   Cardiovascular: Normal rate.    No murmur heard.  Pulmonary/Chest: Effort normal and breath sounds normal.   Abdominal: Soft. He exhibits no mass.   No hernia noted   Musculoskeletal: He exhibits no edema.   Neurological: He is alert. No cranial nerve deficit or sensory deficit. Coordination and gait normal.        Skin: No rash noted.   No nodules   Psychiatric: He has a normal mood and affect. Judgment normal.   Vitals reviewed.      Lab Review:   Results for ITA NEWTON (MRN [id number]) as of 4/15/2017 11:43   Ref. Range 3/16/2017 12:38 3/30/2017 13:01   Cortisol -8 AM Latest Ref Range: 4.30 - 22.40 ug/dL  2.4 (L)   ACTH Latest Ref Range: 0 - 46 pg/mL  14   TSH Latest Ref  Range: 0.400 - 4.000 uIU/mL 0.972 0.630   Free T4 Latest Ref Range: 0.71 - 1.51 ng/dL 1.02 0.83      Results for ITA NEWTON (MRN [id number]) as of 4/15/2017 11:43   Ref. Range 3/16/2017 12:38 3/30/2017 13:01   FSH Latest Ref Range: 0.95 - 11.95 mIU/mL  1.10   Prolactin Latest Ref Range: 3.5 - 19.4 ng/mL 0.8 (L) 4.3   Testosterone, Total Latest Ref Range: 195.0 - 1138.0 ng/dL  204     Assessment:     1. Pituitary adenoma  Cortisol    DHEA-sulfate    ACTH    Basic metabolic panel    Urinalysis    Ambulatory Referral to Urology   2. Secondary male hypogonadism  Cortisol    DHEA-sulfate    ACTH    Basic metabolic panel    Urinalysis    Ambulatory Referral to Urology   3. Corticotroph adenoma          # Corticotroph adenoma s/p TSS x 2 and craniotomy x 1     - We need to get AM cortisol and ACTH but due to limitations with his travel and limited access to labs we will get random cortisol and ACTH today with DHEAS   - get AM cortisol and ACTH ASAP as external ans send me results     # hypogonadotrophic hypogonadism   - non compliant with testosterone injections   - we discussed different options of testosterone replacement   - he would like to explore options of testosterone pellets   Urology referral     # check IGF -1 today     # check BMP and UA today     # fatigue multifactorial   Vitamin D       Plan:     Follow up:  Blood work and urine test today     [phone]  Prefers phone call

## 2017-04-12 ENCOUNTER — TELEPHONE (OUTPATIENT)
Dept: ENDOCRINOLOGY | Facility: CLINIC | Age: 42
End: 2017-04-12

## 2017-04-12 DIAGNOSIS — E27.40 ADRENAL INSUFFICIENCY: Primary | ICD-10-CM

## 2017-04-12 RX ORDER — HYDROCORTISONE 5 MG/1
TABLET ORAL
Qty: 100 TABLET | Refills: 1 | Status: SHIPPED | OUTPATIENT
Start: 2017-04-12

## 2017-04-12 NOTE — TELEPHONE ENCOUNTER
I called Mr.Jason Moulton left voicemail to call back     Cortisol low  DHEAS low possible adrenal insufficiency  Plan to get AM cortisol and ACTH and ATH stim test   If he can not do either one then start hydrocortisone 10 mg in the morning and 5 mg in the evening   Needs f/u with me around the time sees

## 2017-04-13 NOTE — TELEPHONE ENCOUNTER
Discussed with him about plan as below   He will get blood work on Monday morning for ACTH and cortisol  Advised not to take hydrocortisone before blood work    Take if get sick

## 2017-04-13 NOTE — TELEPHONE ENCOUNTER
----- Message from Ivis Townsend LPN sent at 4/13/2017 10:02 AM CDT -----  Contact: pt   [phone]  Isabella manjarrez, returning your call  ----- Message -----     From: Lottie Coates     Sent: 4/13/2017   9:57 AM       To: Demarcus Mora Staff    stokes   -   Patient is returning the dr phone call in regards to his labs  thanks

## 2017-04-19 ENCOUNTER — TELEPHONE (OUTPATIENT)
Dept: ENDOCRINOLOGY | Facility: CLINIC | Age: 42
End: 2017-04-19

## 2017-04-19 LAB — ACTH PLAS-MCNC: NORMAL PG/ML

## 2017-04-20 LAB — MAYO MISCELLANEOUS RESULT (REF): NORMAL

## 2017-04-21 ENCOUNTER — TELEPHONE (OUTPATIENT)
Dept: ENDOCRINOLOGY | Facility: CLINIC | Age: 42
End: 2017-04-21

## 2017-04-21 NOTE — TELEPHONE ENCOUNTER
----- Message from Dolores Rico sent at 4/21/2017 10:36 AM CDT -----  Contact: pt  Pt calling to ask did you receive fax on blood work yesterday 4/20   -  From  [facility]    Please call  Ph [phone]    Thanks

## 2017-04-25 ENCOUNTER — TELEPHONE (OUTPATIENT)
Dept: ENDOCRINOLOGY | Facility: CLINIC | Age: 42
End: 2017-04-25

## 2017-04-25 NOTE — TELEPHONE ENCOUNTER
----- Message from Jo Christensen sent at 4/24/2017 10:19 AM CDT -----  Contact: Patient: [phone]  Patient called and stated that he has sent over his lab results and wants to know if it has been reviewed by the doctor.  Patient can be reached at [phone]. Please call.  Thanks

## 2017-04-27 ENCOUNTER — TELEPHONE (OUTPATIENT)
Dept: ENDOCRINOLOGY | Facility: CLINIC | Age: 42
End: 2017-04-27

## 2017-04-27 NOTE — TELEPHONE ENCOUNTER
----- Message from Isela Torres sent at 4/27/2017  8:36 AM CDT -----  Contact: Patient  Patient is requesting a call back in regards to lab results.    Patient need to discuss if he should continue Rx hydrocortisone (CORTEF) 5 MG Tab.    Please call patient at [phone].

## 2017-05-01 ENCOUNTER — TELEPHONE (OUTPATIENT)
Dept: ENDOCRINOLOGY | Facility: CLINIC | Age: 42
End: 2017-05-01

## 2017-05-01 NOTE — TELEPHONE ENCOUNTER
----- Message from Jaiden Garcia sent at 5/1/2017 11:34 AM CDT -----  Contact: Pt   Pt would like a call back from nurse    Pt states he missed earlier call and would like a call ASAP    Can be reached at [phone]

## 2017-05-01 NOTE — TELEPHONE ENCOUNTER
----- Message from Cyndi Craig sent at 4/28/2017  4:01 PM CDT -----  Contact: Self [phone]  Pt missed your call on yesterday. He can be reached at [phone].

## 2017-05-01 NOTE — TELEPHONE ENCOUNTER
I called and spoke to Mr.Jason Moulton discussed plan as below  AM cortisol is low   Start hydrocortisone   Get blood work at 8 AM on 5/29/17   To decide f/u

## 2017-05-17 ENCOUNTER — PATIENT MESSAGE (OUTPATIENT)
Dept: RESEARCH | Facility: HOSPITAL | Age: 42
End: 2017-05-17

## 2017-05-29 ENCOUNTER — HOSPITAL ENCOUNTER (OUTPATIENT)
Dept: RADIOLOGY | Facility: HOSPITAL | Age: 42
Discharge: HOME | End: 2017-05-29
Attending: NEUROLOGICAL SURGERY
Payer: OTHER GOVERNMENT

## 2017-05-29 ENCOUNTER — OFFICE VISIT (OUTPATIENT)
Dept: NEUROSURGERY | Facility: CLINIC | Age: 42
End: 2017-05-29
Payer: OTHER GOVERNMENT

## 2017-05-29 VITALS
BODY MASS INDEX: 24.64 KG/M2 | DIASTOLIC BLOOD PRESSURE: 78 MMHG | TEMPERATURE: 99 F | HEIGHT: 70 IN | HEART RATE: 75 BPM | WEIGHT: 172.13 LBS | SYSTOLIC BLOOD PRESSURE: 116 MMHG

## 2017-05-29 DIAGNOSIS — D35.2 PITUITARY ADENOMA: Primary | ICD-10-CM

## 2017-05-29 DIAGNOSIS — D35.2 PITUITARY ADENOMA: ICD-10-CM

## 2017-05-29 PROCEDURE — 99024 POSTOP FOLLOW-UP VISIT: CPT | Mod: ,,, | Performed by: NEUROLOGICAL SURGERY

## 2017-05-29 PROCEDURE — 99999 PR PBB SHADOW E&M-EST. PATIENT-LVL III: CPT | Mod: PBBFAC,,, | Performed by: NEUROLOGICAL SURGERY

## 2017-05-29 PROCEDURE — 70553 MRI BRAIN STEM W/O & W/DYE: CPT | Mod: 26,,, | Performed by: RADIOLOGY

## 2017-05-29 PROCEDURE — 99213 OFFICE O/P EST LOW 20 MIN: CPT | Mod: PBBFAC,25 | Performed by: NEUROLOGICAL SURGERY

## 2017-05-29 RX ORDER — SILDENAFIL CITRATE 100 MG/1
TABLET, FILM COATED ORAL
COMMUNITY
Start: 2017-05-25

## 2017-05-29 RX ORDER — GADOBUTROL 604.72 MG/ML
5 INJECTION INTRAVENOUS
Status: COMPLETED | OUTPATIENT
Start: 2017-05-29 | End: 2017-05-29

## 2017-05-29 RX ADMIN — GADOBUTROL 5 ML: 604.72 INJECTION INTRAVENOUS at 01:05

## 2017-05-29 NOTE — LETTER
May 29, 2017      Melania Gil MD  [address] [facility] [place] [address]           [facility] - Neurosurgery 7th Fl  [address]  [place] [address]  Phone: [phone]          Patient: Roldan Moulton   MR Number: [id number]   YOB: 1975   Date of Visit: 5/29/2017       Dear Dr. Melania Gil:    Thank you for referring Roldan Moulton to me for evaluation. Attached you will find relevant portions of my assessment and plan of care.    If you have questions, please do not hesitate to call me. I look forward to following Roldan Moulton along with you.    Sincerely,    Maximiliano Kidd MD    Enclosure  CC:  No Recipients    If you would like to receive this communication electronically, please contact [email or web address] or [phone] to request more information on [employer] Link access.    For providers and/or their staff who would like to refer a patient to [facility], please contact us through our one-stop-shop provider referral line, [facility] , at [phone].    If you feel you have received this communication in error or would no longer like to receive these types of communications, please e-mail [email or web address]

## 2017-05-29 NOTE — PROGRESS NOTES
This office note has been dictated.  May 29, 2017    Jose Madison M.D.  [address] [facility] [id number]  [place] [address]    RE:  SaigeJonoRoldan  [facility] Number:  [id number]    Dear Roldan Garcia Saige was seen in neurosurgical followup at the office today.  He has   been doing well over the last few weeks.  His energy level is better.  He has   only occasional headache.  He was seen by his ophthalmologist.  Visual acuity is   correctible to 20/20 in both eyes.  He persists with a left homonymous   hemianopsia.  He was seen in endocrinological evaluation by Dr. Tracy here, and   his hormonal status is stable.    On brief examination today, he is alert and cooperative.  His incision is   healing well.  He has full extraocular movements and equal pupils.  He is able   to see fingers moving in the superior left temporal field, but not in the   inferior.  Speech is clear.  He shows no focal weakness and seems otherwise   neurologically intact.    MRI of the brain was repeated at [facility] today.  There is no evidence for   tumor.  The optic chiasm is seen.  There remains some thinning of the right   optic tract.  The infundibulum is midline.  The pituitary gland appears   unremarkable.    I am happy to see him doing well.  I will have him return to work part-time   tomorrow and if he is doing well after four weeks, he can go to fulltime.  I   will plan to see him back next March, which will be one year postop, with a   followup MRI.    Thank you again for the opportunity to work with you in his care.    Sincerely yours,      ALICE/GAIL  dd: 05/29/2017 14:31:42 (CDT)  td: 05/30/2017 05:40:44 (CDT)  Doc ID   #[id number]  Job ID #[id number]    CC: Roldan Moulton

## 2017-09-07 ENCOUNTER — TELEPHONE (OUTPATIENT)
Dept: NEUROSURGERY | Facility: CLINIC | Age: 42
End: 2017-09-07

## 2017-09-07 NOTE — TELEPHONE ENCOUNTER
SW PT, HE IS REQUESTING A RTW FORM FOR THE  WITH NO RESTRICTIONS & FULL DUTY. APPT MADE FOR THIS PURPOSE, NO MRI AT THIS TIME.

## 2017-09-07 NOTE — TELEPHONE ENCOUNTER
----- Message from Susana Pandya sent at 9/6/2017  2:08 PM CDT -----  Contact: self  Patient states need follow up appointment with doctor   Pt has appointment scheduled  for 10/16/2017.

## 2017-10-16 ENCOUNTER — OFFICE VISIT (OUTPATIENT)
Dept: NEUROSURGERY | Facility: CLINIC | Age: 42
End: 2017-10-16
Payer: OTHER GOVERNMENT

## 2017-10-16 VITALS
HEART RATE: 68 BPM | HEIGHT: 71 IN | BODY MASS INDEX: 25.97 KG/M2 | DIASTOLIC BLOOD PRESSURE: 71 MMHG | SYSTOLIC BLOOD PRESSURE: 122 MMHG | TEMPERATURE: 99 F | WEIGHT: 185.5 LBS

## 2017-10-16 DIAGNOSIS — D35.2 PITUITARY ADENOMA: Primary | ICD-10-CM

## 2017-10-16 PROCEDURE — 99999 PR PBB SHADOW E&M-EST. PATIENT-LVL III: CPT | Mod: PBBFAC,,, | Performed by: NEUROLOGICAL SURGERY

## 2017-10-16 PROCEDURE — 99213 OFFICE O/P EST LOW 20 MIN: CPT | Mod: S$PBB,,, | Performed by: NEUROLOGICAL SURGERY

## 2017-10-16 PROCEDURE — 99213 OFFICE O/P EST LOW 20 MIN: CPT | Mod: PBBFAC | Performed by: NEUROLOGICAL SURGERY

## 2017-10-16 NOTE — PROGRESS NOTES
This office note has been dictated.  October 16, 2017    Denise Lira, NP  [address] [place]  [address]    RE:  ROLDAN NEWTON  [facility] Number:  [id number]    Dear Ms. Pankaj Montilla Saige was seen in neurosurgical followup at the office this morning.  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  He has done well in the postop period.  When I saw him   last May, vision was stable with some relative left hemianopsia.  Visual acuity   is 20/20 bilaterally and hormonal status is normal.  He has been doing very well   over the last few months with no specific complaints.    On brief examination today, he is bright and alert.  His incision is well   healed.  He has a partial left visual field loss, vision is otherwise intact.    Speech is clear.  He shows good strength in the extremities, normal gait and   coordination.    I will release him to full duty with no restrictions.  We have planned to repeat   his MRI next March to monitor the pituitary adenoma.    Thank you for the opportunity to participate in his care.    Sincerely yours,          ALICE/GAIL  dd: 10/16/2017 11:03:21 (CDT)  td: 10/16/2017 22:17:20 (CDT)  Doc ID   #[id number]  Job ID #[id number]    CC: Roldan Newton

## 2018-08-14 ENCOUNTER — TELEPHONE (OUTPATIENT)
Dept: NEUROSURGERY | Facility: CLINIC | Age: 43
End: 2018-08-14

## 2018-08-14 NOTE — TELEPHONE ENCOUNTER
----- Message from Cresencio Briceño sent at 8/14/2018  1:25 PM CDT -----  Contact: Hortencia ([facility]) [phone]  Needs Advice    Reason for call:   Hortencia called to verify if the patient has anything metallic within his brain     Communication Preference:PHONE     Additional Information:

## 2018-08-14 NOTE — TELEPHONE ENCOUNTER
AZAM STEWART, PT CAN HAVE AN MRI, OP NOTE FAXED OVER AS WELL AS THE POST-OPERATIVE MRI DONE HERE AT [facility] AFTER THE SURGERY.

## 2018-08-15 ENCOUNTER — TELEPHONE (OUTPATIENT)
Dept: NEUROSURGERY | Facility: CLINIC | Age: 43
End: 2018-08-15

## 2018-08-15 NOTE — TELEPHONE ENCOUNTER
AZAM THAKUR, RE-FAXED OP REPORT AND RECENT POST-OP MRI BRAIN TO [facility]. PT CAN HAVE A MRI.

## 2018-08-15 NOTE — TELEPHONE ENCOUNTER
----- Message from Harinder Julio sent at 8/15/2018  8:39 AM CDT -----  Contact: Denise ruther[facility] @ [phone]   Caller is requesting a return call about the metal is MRI compatible, pls call

## (undated) DEVICE — ELECTRODE REM PLYHSV RETURN 9

## (undated) DEVICE — BLADE SURG CARBON STEEL SZ11

## (undated) DEVICE — SEE MEDLINE ITEM 154981

## (undated) DEVICE — MARKERS SPHERZ PASSIVE

## (undated) DEVICE — CARTRIDGE OIL

## (undated) DEVICE — DRAPE THYROID WITH ARMBOARD

## (undated) DEVICE — HEMOSTAT SURGICEL 4X8IN

## (undated) DEVICE — CLEANER TIP ELECSURG 2X2IN

## (undated) DEVICE — STAPLER SKIN PROXIMATE WIDE

## (undated) DEVICE — TUBE FRAZIER 5MM 2FT SOFT TIP

## (undated) DEVICE — GAUZE SPONGE 4X4 12PLY

## (undated) DEVICE — MARKER SKIN STND TIP BLUE BARR

## (undated) DEVICE — SEE MEDLINE ITEM 156905

## (undated) DEVICE — KIT SURGIFLO EVITHROM

## (undated) DEVICE — SPONGE GAUZE 16PLY 4X4

## (undated) DEVICE — BUR BONE CUT MICRO TPS 3X3.8MM

## (undated) DEVICE — DIFFUSER

## (undated) DEVICE — TIP SONAPET STRAIGHT

## (undated) DEVICE — HOOK STAY ELAS 5MM 8EA/PK

## (undated) DEVICE — DRESSING SURGICAL 1/2X1/2

## (undated) DEVICE — DRUG BACITRACIN ZINC

## (undated) DEVICE — WARMER DRAPE STERILE LF

## (undated) DEVICE — RUBBERBAND STERILE 3X1/8IN

## (undated) DEVICE — SEE MEDLINE ITEM 152622

## (undated) DEVICE — SPRAY MASTISOL

## (undated) DEVICE — DRESSING SURGICAL 1X1

## (undated) DEVICE — CORD BIPOLAR 12 FOOT

## (undated) DEVICE — ROUTER TAPERED 2.3MM

## (undated) DEVICE — DRAPE OPMI STERILE

## (undated) DEVICE — Device

## (undated) DEVICE — TAPE SURG MEDIPORE 6X72IN

## (undated) DEVICE — CONTAINER SPECIMEN STRL 4OZ

## (undated) DEVICE — DRAPE STERI INSTRUMENT 1018

## (undated) DEVICE — SPONGE NEURO 1/4X1/4

## (undated) DEVICE — DRESSING TELFA STRL 4X3 LF

## (undated) DEVICE — DRESSING TELFA N ADH 3X8

## (undated) DEVICE — NDL BOX COUNTER

## (undated) DEVICE — SUT VICRYL PLUS 3-0 SH 18IN

## (undated) DEVICE — DRESSING SURGICAL 1X3

## (undated) DEVICE — SUT 4/0 18IN NUROLON BLK B

## (undated) DEVICE — DRESSING XEROFORM 1X8IN